# Patient Record
Sex: MALE | Race: WHITE | NOT HISPANIC OR LATINO | Employment: OTHER | URBAN - METROPOLITAN AREA
[De-identification: names, ages, dates, MRNs, and addresses within clinical notes are randomized per-mention and may not be internally consistent; named-entity substitution may affect disease eponyms.]

---

## 2017-07-11 NOTE — PROGRESS NOTES
SUBJECTIVE:                                                    Alex Lynch is a 47 year old male who presents to clinic today for the following health issues:      Back Pain       Duration: 4 days ago         Specific cause: lifting, work-related    Description:   Location of pain: low back bilateral  Character of pain: sharp and constant  Pain radiation:radiates into the left leg  New numbness or weakness in legs, not attributed to pain:  no    Patient was picking up a dock plate and immediately felt a a pain in his back and was intense, he reports pain is somewhat better today , about an 8/10 now , reports was about 10/10 when it happened    Intensity: Currently 8/10    History:   Pain interferes with job: YES  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  Any previous MRI or X-rays: None  Sees a specialist for back pain:  No  Therapies tried without relief: sitting and standing    Alleviating factors:   Improved by: none      Precipitating factors:  Worsened by: Lifting, Bending, Standing and Sitting    Functional and Psychosocial Screen (Katy STarT Back):      Not performed today    Patient went to the urgent care in [place] , got some pain medicine , he reports he was asked to follow up, He is still not feeling better                     Accompanying Signs & Symptoms:  Risk of Fracture:  None  Risk of Cauda Equina:  None  Risk of Infection:  None  Risk of Cancer:  None  Risk of Ankylosing Spondylitis:  Onset at age <35, male, AND morning back stiffness. no           Problem list and histories reviewed & adjusted, as indicated.  Additional history: as documented    Patient Active Problem List   Diagnosis     Tobacco use disorder     Past Surgical History:   Procedure Laterality Date     CARDIAC SURGERY       ENT SURGERY       HERNIA REPAIR       ORTHOPEDIC SURGERY         Social History   Substance Use Topics     Smoking status: Current Every Day Smoker     Packs/day:  "1.00     Years: 30.00     Smokeless tobacco: Never Used     Alcohol use Yes      Comment: rarely     History reviewed. No pertinent family history.      No current outpatient prescriptions on file.     No Known Allergies  BP Readings from Last 3 Encounters:   07/12/17 132/74   12/14/16 (!) 141/96   12/05/16 136/84    Wt Readings from Last 3 Encounters:   07/12/17 217 lb (98.4 kg)   12/14/16 219 lb (99.3 kg)   12/05/16 225 lb (102.1 kg)                  Labs reviewed in EPIC    Reviewed and updated as needed this visit by clinical staff       Reviewed and updated as needed this visit by Provider         ROS:  C: NEGATIVE for fever, chills, change in weight  E/M: NEGATIVE for ear, mouth and throat problems  R: NEGATIVE for significant cough or SOB  CV: NEGATIVE for chest pain, palpitations or peripheral edema  MUSCULOSKELETAL: POSITIVE  for back pain     OBJECTIVE:                                                    /74  Pulse 70  Temp 98.4  F (36.9  C) (Temporal)  Resp 16  Ht 5' 9\" (1.753 m)  Wt 217 lb (98.4 kg)  BMI 32.05 kg/m2  Body mass index is 32.05 kg/(m^2).   GENERAL:  alert,  well hydrated, no distress  RESP: lungs clear to auscultation - no rales, no rhonchi, no wheezes  CV: regular rates and rhythm, normal S1 S2, no S3 or S4 and no murmur, no click or rub -  ABDOMEN: soft, no tenderness, no  hepatosplenomegaly, no masses, normal bowel sounds  Comprehensive back pain exam:  Tenderness of  bilateral midline low back and tightness over the paralumbar muscles, Pain limits the following motions: standing , sitting , bending from side to side , Lower extremity strength functional and equal on both sides, Lower extremity reflexes within normal limits bilaterally, Lower extremity sensation normal and equal on both sides and Straight leg raise negative bilaterally    Diagnostic test results:  Diagnostic Test Results:  Xr Lumbar : Initially reviewed by me , no obvious abnormality noted   Has screws and " plate in the hip      ASSESSMENT/PLAN:                                                    (M54.5) Acute bilateral low back pain without sciatica  (primary encounter diagnosis)  Comment:   Plan: XR Lumbar Spine 2/3 Views, cyclobenzaprine         (FLEXERIL) 10 MG tablet,         HYDROcodone-acetaminophen (NORCO) 5-325 MG per         tablet        Workability form completed , off work for one week , stretching exercises given , will reevalute in 1 week if not better , will consider referral to PT     (W95.983) Back muscle spasm  Comment:   Plan: cyclobenzaprine (FLEXERIL) 10 MG tablet            Follow up with Provider - in 1 weeks for reevaluation      Nidhi Guzman MD, MD  [facility]

## 2017-07-12 ENCOUNTER — OFFICE VISIT (OUTPATIENT)
Dept: FAMILY MEDICINE | Facility: OTHER | Age: 47
End: 2017-07-12
Payer: OTHER MISCELLANEOUS

## 2017-07-12 ENCOUNTER — RADIANT APPOINTMENT (OUTPATIENT)
Dept: GENERAL RADIOLOGY | Facility: OTHER | Age: 47
End: 2017-07-12
Attending: FAMILY MEDICINE
Payer: OTHER MISCELLANEOUS

## 2017-07-12 VITALS
BODY MASS INDEX: 32.14 KG/M2 | HEART RATE: 70 BPM | TEMPERATURE: 98.4 F | RESPIRATION RATE: 16 BRPM | WEIGHT: 217 LBS | SYSTOLIC BLOOD PRESSURE: 132 MMHG | DIASTOLIC BLOOD PRESSURE: 74 MMHG | HEIGHT: 69 IN

## 2017-07-12 DIAGNOSIS — M54.50 ACUTE BILATERAL LOW BACK PAIN WITHOUT SCIATICA: Primary | ICD-10-CM

## 2017-07-12 DIAGNOSIS — M54.50 ACUTE BILATERAL LOW BACK PAIN WITHOUT SCIATICA: ICD-10-CM

## 2017-07-12 DIAGNOSIS — M62.830 BACK MUSCLE SPASM: ICD-10-CM

## 2017-07-12 PROCEDURE — 72100 X-RAY EXAM L-S SPINE 2/3 VWS: CPT

## 2017-07-12 PROCEDURE — 99214 OFFICE O/P EST MOD 30 MIN: CPT | Performed by: FAMILY MEDICINE

## 2017-07-12 RX ORDER — CYCLOBENZAPRINE HCL 10 MG
10 TABLET ORAL 3 TIMES DAILY PRN
Qty: 90 TABLET | Refills: 1 | Status: SHIPPED | OUTPATIENT
Start: 2017-07-12 | End: 2018-01-05

## 2017-07-12 RX ORDER — HYDROCODONE BITARTRATE AND ACETAMINOPHEN 5; 325 MG/1; MG/1
1-2 TABLET ORAL EVERY 8 HOURS PRN
Qty: 10 TABLET | Refills: 0 | Status: SHIPPED | OUTPATIENT
Start: 2017-07-12 | End: 2017-07-28

## 2017-07-12 ASSESSMENT — PAIN SCALES - GENERAL: PAINLEVEL: EXTREME PAIN (8)

## 2017-07-12 NOTE — MR AVS SNAPSHOT
"              After Visit Summary   2017    Alex Lynch    MRN: [id number]           Patient Information     Date Of Birth          1970        Visit Information        Provider Department      2017 8:40 AM Nidhi Guzman MD [facility]        Today's Diagnoses     Acute bilateral low back pain without sciatica    -  1    Back muscle spasm           Follow-ups after your visit        Who to contact     If you have questions or need follow up information about today's clinic visit or your schedule please contact [facility] directly at [phone].  Normal or non-critical lab and imaging results will be communicated to you by [employer], letter or phone within 4 business days after the clinic has received the results. If you do not hear from us within 7 days, please contact the clinic through [employer] or phone. If you have a critical or abnormal lab result, we will notify you by phone as soon as possible.  Submit refill requests through [employer] or call your pharmacy and they will forward the refill request to us. Please allow 3 business days for your refill to be completed.          Additional Information About Your Visit        MyChart Information     [employer] lets you send messages to your doctor, view your test results, renew your prescriptions, schedule appointments and more. To sign up, go to [email or web address] . Click on \"Log in\" on the left side of the screen, which will take you to the Welcome page. Then click on \"Sign up Now\" on the right side of the page.     You will be asked to enter the access code listed below, as well as some personal information. Please follow the directions to create your username and password.     Your access code is: [email or web address]  Expires: 10/10/2017  9:33 AM     Your access code will  in 90 days. If you need help or a new code, please call your [facility] or [phone].        Care EveryWhere ID     This " "is your Care EveryWhere ID. This could be used by other organizations to access your [place] medical records  [id number]        Your Vitals Were     Pulse Temperature Respirations Height BMI (Body Mass Index)       70 98.4  F (36.9  C) (Temporal) 16 5' 9\" (1.753 m) 32.05 kg/m2        Blood Pressure from Last 3 Encounters:   07/12/17 132/74   12/14/16 (!) 141/96   12/05/16 136/84    Weight from Last 3 Encounters:   07/12/17 217 lb (98.4 kg)   12/14/16 219 lb (99.3 kg)   12/05/16 225 lb (102.1 kg)                 Today's Medication Changes          These changes are accurate as of: 7/12/17  9:33 AM.  If you have any questions, ask your nurse or doctor.               Start taking these medicines.        Dose/Directions    cyclobenzaprine 10 MG tablet   Commonly known as:  FLEXERIL   Used for:  Back muscle spasm, Acute bilateral low back pain without sciatica   Started by:  Nidhi Guzman MD        Dose:  10 mg   Take 1 tablet (10 mg) by mouth 3 times daily as needed for muscle spasms   Quantity:  90 tablet   Refills:  1       HYDROcodone-acetaminophen 5-325 MG per tablet   Commonly known as:  NORCO   Used for:  Acute bilateral low back pain without sciatica   Started by:  Nidhi Guzman MD        Dose:  1-2 tablet   Take 1-2 tablets by mouth every 8 hours as needed for moderate to severe pain maximum 3tablet(s) per day   Quantity:  10 tablet   Refills:  0            Where to get your medicines      These medications were sent to [facility] - [address]  [address] [address]     Phone:  [phone]     cyclobenzaprine 10 MG tablet         Some of these will need a paper prescription and others can be bought over the counter.  Ask your nurse if you have questions.     Bring a paper prescription for each of these medications     HYDROcodone-acetaminophen 5-325 MG per tablet                Primary Care Provider Office Phone #    Octavio " [facility] [phone]       No address on file        Equal Access to Services     SELINA MARIBEL : Hadii aad ku hadmaribelalice Soalen, waverónicada luqadaha, qajaironta kaaugustda rebecajoycelynrachel, aydee snyder inezshaji mendesmaiteboni hatch. So [facility] [phone].    ATENCIÓN: Si habla español, tiene a bruce disposición servicios gratuitos de asistencia lingüística. Llame al [phone].    We comply with applicable federal civil rights laws and [place] laws. We do not discriminate on the basis of race, color, national origin, age, disability sex, sexual orientation or gender identity.            Thank you!     Thank you for choosing [facility]  for your care. Our goal is always to provide you with excellent care. Hearing back from our patients is one way we can continue to improve our services. Please take a few minutes to complete the written survey that you may receive in the mail after your visit with us. Thank you!             Your Updated Medication List - Protect others around you: Learn how to safely use, store and throw away your medicines at www.disposemymeds.org.          This list is accurate as of: 7/12/17  9:33 AM.  Always use your most recent med list.                   Brand Name Dispense Instructions for use Diagnosis    cyclobenzaprine 10 MG tablet    FLEXERIL    90 tablet    Take 1 tablet (10 mg) by mouth 3 times daily as needed for muscle spasms    Back muscle spasm, Acute bilateral low back pain without sciatica       HYDROcodone-acetaminophen 5-325 MG per tablet    NORCO    10 tablet    Take 1-2 tablets by mouth every 8 hours as needed for moderate to severe pain maximum 3tablet(s) per day    Acute bilateral low back pain without sciatica

## 2017-07-12 NOTE — NURSING NOTE
"Chief Complaint   Patient presents with     Back Pain     Panel Management     mychart, tdap, ldl, carlin        Initial /74  Pulse 70  Temp 98.4  F (36.9  C) (Temporal)  Resp 16  Ht 5' 9\" (1.753 m)  Wt 217 lb (98.4 kg)  BMI 32.05 kg/m2 Estimated body mass index is 32.05 kg/(m^2) as calculated from the following:    Height as of this encounter: 5' 9\" (1.753 m).    Weight as of this encounter: 217 lb (98.4 kg).  Medication Reconciliation: complete     Nayely Yousif MA    "

## 2017-07-12 NOTE — LETTER
[facility]  [address] [place] [id number] [phone]    REPORT OF WORK ABILITY    NOTE TO EMPLOYEE: You must promptly provide a copy of this report to your  employer or worker's compensation insurer, and Qualified Rehabilitation Consultant.    Date: 7/12/2017                     Employee Name: Alex Lynch         YOB: 1970  Medical Record Number: [id number]   Soc.Sec.No: [id number]  Employer: [employer]                Date of Injury: 07/07/17  Managed Care Organization / Insurance Company Name: UNKNOWN    Diagnosis:  Low back pain without sciatica due to injury  Low back strain     Work Related: yes    Permanent Partial Disability(PPD) likely: UNKNOWN    EMPLOYEE IS UNABLE TO WORK: 07/12/17- 07/19/17         TREATMENT PLAN/NOTES: Stretches and continue medications as discussed.      Nidhi Guzman MD

## 2017-07-17 NOTE — PROGRESS NOTES
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  Date 07/12/2017  Sees a specialist for back pain:  No  Therapies tried without relief: none    Alleviating factors:   Improved by: muscle relaxants and opioids      Precipitating factors:  Worsened by: Standing on both feet, patient states he has to apply most of his weight onto his right side to alleviate some of the pressure on his left. He states he is feeling pain in his abdomen when standing on both feet.     Functional and Psychosocial Screen (Katy STarT Back):      Not performed today              Problem list and histories reviewed & adjusted, as indicated.  Additional history: as documented    Patient Active Problem List   Diagnosis     Tobacco use disorder     Past Surgical History:   Procedure Laterality Date     CARDIAC SURGERY       ENT SURGERY       HERNIA REPAIR       ORTHOPEDIC SURGERY         Social History   Substance Use Topics     Smoking status: Current Every Day Smoker     Packs/day: 1.00     Years: 30.00     Smokeless tobacco: Never Used     Alcohol use Yes      Comment: rarely     History reviewed. No pertinent family history.      Current Outpatient Prescriptions   Medication Sig Dispense Refill     cyclobenzaprine (FLEXERIL) 10 MG tablet Take 1 tablet (10 mg) by mouth 3 times daily as needed for muscle spasms 90 tablet 1     HYDROcodone-acetaminophen  "(NORCO) 5-325 MG per tablet Take 1-2 tablets by mouth every 8 hours as needed for moderate to severe pain maximum 3tablet(s) per day 10 tablet 0     No Known Allergies  BP Readings from Last 3 Encounters:   07/18/17 130/68   07/12/17 132/74   12/14/16 (!) 141/96    Wt Readings from Last 3 Encounters:   07/18/17 217 lb (98.4 kg)   07/12/17 217 lb (98.4 kg)   12/14/16 219 lb (99.3 kg)                  Labs reviewed in EPIC    Reviewed and updated as needed this visit by clinical staff  Tobacco  Allergies  Meds  Med Hx  Surg Hx  Fam Hx  Soc Hx      Reviewed and updated as needed this visit by Provider         ROS:  C: NEGATIVE for fever, chills, change in weight  E/M: NEGATIVE for ear, mouth and throat problems  R: NEGATIVE for significant cough or SOB  CV: NEGATIVE for chest pain, palpitations or peripheral edema  MUSCULOSKELETAL: POSITIVE  for back pain     OBJECTIVE:                                                    /68  Pulse 72  Temp 98  F (36.7  C) (Temporal)  Resp 16  Ht 5' 9\" (1.753 m)  Wt 217 lb (98.4 kg)  BMI 32.05 kg/m2  Body mass index is 32.05 kg/(m^2).   GENERAL:  alert,  well hydrated, no distress  RESP: lungs clear to auscultation - no rales, no rhonchi, no wheezes  CV: regular rates and rhythm, normal S1 S2, no S3 or S4 and no murmur, no click or rub -  ABDOMEN: soft, no tenderness, no  hepatosplenomegaly, no masses, normal bowel sounds  Comprehensive back pain exam:  Tenderness of  bilateral midline low back and tightness over the paralumbar muscles, Able to walk now without significant pain, Lower extremity strength functional and equal on both sides, Lower extremity reflexes within normal limits bilaterally, Lower extremity sensation normal and equal on both sides and Straight leg raise negative bilaterally    Diagnostic test results:  Diagnostic Test Results:  Xr Lumbar : Initially reviewed by me , no obvious abnormality noted   Has screws and plate in the hip  "     ASSESSMENT/PLAN:                                                    ((M54.5) Bilateral low back pain without sciatica, unspecified chronicity  (primary encounter diagnosis)  Comment: Continue with medication , will start PT   Plan: PHYSICAL THERAPY REFERRAL        Work excuse for another week given             Follow up with Provider - in 1 weeks for reevaluation      Nidhi Guzman MD, MD  [facility]

## 2017-07-18 ENCOUNTER — OFFICE VISIT (OUTPATIENT)
Dept: FAMILY MEDICINE | Facility: OTHER | Age: 47
End: 2017-07-18
Payer: OTHER MISCELLANEOUS

## 2017-07-18 VITALS
WEIGHT: 217 LBS | TEMPERATURE: 98 F | SYSTOLIC BLOOD PRESSURE: 130 MMHG | HEART RATE: 72 BPM | BODY MASS INDEX: 32.14 KG/M2 | HEIGHT: 69 IN | RESPIRATION RATE: 16 BRPM | DIASTOLIC BLOOD PRESSURE: 68 MMHG

## 2017-07-18 DIAGNOSIS — M54.50 BILATERAL LOW BACK PAIN WITHOUT SCIATICA, UNSPECIFIED CHRONICITY: Primary | ICD-10-CM

## 2017-07-18 PROCEDURE — 99213 OFFICE O/P EST LOW 20 MIN: CPT | Performed by: FAMILY MEDICINE

## 2017-07-18 ASSESSMENT — PAIN SCALES - GENERAL: PAINLEVEL: MODERATE PAIN (4)

## 2017-07-18 NOTE — LETTER
[facility]  [address] [place] [id number] [phone]    REPORT OF WORK ABILITY    NOTE TO EMPLOYEE: You must promptly provide a copy of this report to your  employer or worker's compensation insurer, and Qualified Rehabilitation Consultant.    Date: 7/18/2017                     Employee Name: Alex Lynch         YOB: 1970  Medical Record Number: [id number]   Soc.Sec.No: [id number]  Employer: [employer]                Date of Injury: 07/07/17  Managed Care Organization / Insurance Company Name: UNKNOWN    Diagnosis:  Low back pain without sciatica due to injury  Low back strain     Work Related: yes    Permanent Partial Disability(PPD) likely: UNKNOWN    EMPLOYEE IS UNABLE TO WORK: 07/19/17- 07/28/17         TREATMENT PLAN/NOTES: Stretches and continue medications, referral placed to PT       Nidhi Guzman MD

## 2017-07-18 NOTE — NURSING NOTE
"Chief Complaint   Patient presents with     Back Pain     Panel Management     tdap, ldl, mychart        Initial /68  Pulse 72  Temp 98  F (36.7  C) (Temporal)  Resp 16  Ht 5' 9\" (1.753 m)  Wt 217 lb (98.4 kg)  BMI 32.05 kg/m2 Estimated body mass index is 32.05 kg/(m^2) as calculated from the following:    Height as of this encounter: 5' 9\" (1.753 m).    Weight as of this encounter: 217 lb (98.4 kg).  Medication Reconciliation: complete     Nayely Yousif MA    "

## 2017-07-18 NOTE — MR AVS SNAPSHOT
"              After Visit Summary   7/18/2017    Alex Lynch    MRN: [id number]           Patient Information     Date Of Birth          1970        Visit Information        Provider Department      7/18/2017 2:00 PM Nidhi Guzman MD [facility] [place]        Today's Diagnoses     Bilateral low back pain without sciatica, unspecified chronicity    -  1       Follow-ups after your visit        Additional Services     PHYSICAL THERAPY REFERRAL       *This therapy referral will be filtered to a centralized scheduling office at [facility] and the patient will receive a call to schedule an appointment at a [place] location most convenient for them. *     [facility] provides Physical Therapy evaluation and treatment and many specialty services across the [place] system.  If requesting a specialty program, please choose from the list below.    If you have not heard from the scheduling office within 2 business days, please call [phone] for all locations, with the exception of [place], please call [phone].  Treatment: Evaluation & Treatment  Special Instructions/Modalities: None  Special Programs: None    Please be aware that coverage of these services is subject to the terms and limitations of your health insurance plan.  Call member services at your health plan with any benefit or coverage questions.      **Note to Provider:  If you are referring outside of [place] for the therapy appointment, please list the name of the location in the \"special instructions\" above, print the referral and give to the patient to schedule the appointment.                  Who to contact     If you have questions or need follow up information about today's clinic visit or your schedule please contact [facility] directly at [phone].  Normal or non-critical lab and imaging results will be communicated to you by MyChart, letter or phone " "within 4 business days after the clinic has received the results. If you do not hear from us within 7 days, please contact the clinic through [employer] or phone. If you have a critical or abnormal lab result, we will notify you by phone as soon as possible.  Submit refill requests through [employer] or call your pharmacy and they will forward the refill request to us. Please allow 3 business days for your refill to be completed.          Additional Information About Your Visit        [employer] Information     [employer] lets you send messages to your doctor, view your test results, renew your prescriptions, schedule appointments and more. To sign up, go to [email or web address] . Click on \"Log in\" on the left side of the screen, which will take you to the Welcome page. Then click on \"Sign up Now\" on the right side of the page.     You will be asked to enter the access code listed below, as well as some personal information. Please follow the directions to create your username and password.     Your access code is: [email or web address]  Expires: 10/10/2017  9:33 AM     Your access code will  in 90 days. If you need help or a new code, please call your [place] clinic or [phone].        Care EveryWhere ID     This is your Care EveryWhere ID. This could be used by other organizations to access your [place] medical records  [id number]        Your Vitals Were     Pulse Temperature Respirations Height BMI (Body Mass Index)       72 98  F (36.7  C) (Temporal) 16 5' 9\" (1.753 m) 32.05 kg/m2        Blood Pressure from Last 3 Encounters:   17 130/68   17 132/74   16 (!) 141/96    Weight from Last 3 Encounters:   17 217 lb (98.4 kg)   17 217 lb (98.4 kg)   16 219 lb (99.3 kg)              We Performed the Following     PHYSICAL THERAPY REFERRAL        Primary Care Provider Office Phone #    [facility] [phone]       No address on file        Equal Access to " Services     [facility]: Hadii aad ku hadmaribelalice Mirianalen, waaxda luqadaha, qaybta kaalmarachel garcia, aydee werner . So [facility] [phone].    ATENCIÓN: Si habla español, tiene a bruce disposición servicios gratuitos de asistencia lingüística. Llame al [phone].    We comply with applicable federal civil rights laws and [place] laws. We do not discriminate on the basis of race, color, national origin, age, disability sex, sexual orientation or gender identity.            Thank you!     Thank you for choosing [facility]  for your care. Our goal is always to provide you with excellent care. Hearing back from our patients is one way we can continue to improve our services. Please take a few minutes to complete the written survey that you may receive in the mail after your visit with us. Thank you!             Your Updated Medication List - Protect others around you: Learn how to safely use, store and throw away your medicines at www.disposemymeds.org.          This list is accurate as of: 7/18/17  2:16 PM.  Always use your most recent med list.                   Brand Name Dispense Instructions for use Diagnosis    cyclobenzaprine 10 MG tablet    FLEXERIL    90 tablet    Take 1 tablet (10 mg) by mouth 3 times daily as needed for muscle spasms    Back muscle spasm, Acute bilateral low back pain without sciatica       HYDROcodone-acetaminophen 5-325 MG per tablet    NORCO    10 tablet    Take 1-2 tablets by mouth every 8 hours as needed for moderate to severe pain maximum 3tablet(s) per day    Acute bilateral low back pain without sciatica

## 2017-07-24 ENCOUNTER — HOSPITAL ENCOUNTER (OUTPATIENT)
Dept: PHYSICAL THERAPY | Facility: OTHER | Age: 47
Setting detail: THERAPIES SERIES
End: 2017-07-24
Attending: FAMILY MEDICINE
Payer: OTHER MISCELLANEOUS

## 2017-07-24 PROCEDURE — 97162 PT EVAL MOD COMPLEX 30 MIN: CPT | Mod: GP | Performed by: PHYSICAL THERAPIST

## 2017-07-24 PROCEDURE — 97112 NEUROMUSCULAR REEDUCATION: CPT | Mod: GP | Performed by: PHYSICAL THERAPIST

## 2017-07-24 PROCEDURE — 40000718 ZZHC STATISTIC PT DEPARTMENT ORTHO VISIT: Performed by: PHYSICAL THERAPIST

## 2017-07-24 PROCEDURE — 97110 THERAPEUTIC EXERCISES: CPT | Mod: GP | Performed by: PHYSICAL THERAPIST
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
## 2017-07-25 ENCOUNTER — TELEPHONE (OUTPATIENT)
Dept: FAMILY MEDICINE | Facility: OTHER | Age: 47
End: 2017-07-25

## 2017-07-25 NOTE — TELEPHONE ENCOUNTER
LM for patient to return call to clinic. When call is returned please ask patient to schedule a follow up- we received a letter from his employer's insurance stating they are anticipating him to return to work with light duty restrictions. Please have him follow up and reevaluated and can write workability note at [facility] depending on how he is doing.    Nayely Yousif MA

## 2017-07-26 NOTE — PROGRESS NOTES
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  Date 07/12/2017  Sees a specialist for back pain:  No  Therapies tried without relief: none    Alleviating factors:   Improved by: muscle relaxants and opioids      Precipitating factors:  Worsened by: Standing on both feet, patient states he has to apply most of his weight onto his right side to alleviate some of the pressure on his left. He states he is feeling pain in his abdomen when standing on both feet. Now having difficult standing up straight     Functional and Psychosocial Screen (Katy STarT Back):      Not performed today    He reports he started PT yesterday , felt better after PT yesterday , but went walking his dog yesterday and had to bend down to help clean his poop up and re injured his back , since last night , pain has been worse since then , back to where he started from             Problem list and histories reviewed & adjusted, as indicated.  Additional history: as documented    Patient Active Problem List   Diagnosis     Tobacco use disorder     Past Surgical History:   Procedure Laterality Date     CARDIAC SURGERY       ENT SURGERY       HERNIA REPAIR       ORTHOPEDIC SURGERY         Social History   Substance Use Topics     Smoking status: Current Every Day Smoker     Packs/day: 1.00     Years: 30.00     Types: Cigarettes     Smokeless tobacco:  "Never Used     Alcohol use Yes      Comment: rarely     History reviewed. No pertinent family history.      Current Outpatient Prescriptions   Medication Sig Dispense Refill     cyclobenzaprine (FLEXERIL) 10 MG tablet Take 1 tablet (10 mg) by mouth 3 times daily as needed for muscle spasms 90 tablet 1     HYDROcodone-acetaminophen (NORCO) 5-325 MG per tablet Take 1-2 tablets by mouth every 8 hours as needed for moderate to severe pain maximum 3tablet(s) per day 10 tablet 0     No Known Allergies  BP Readings from Last 3 Encounters:   07/28/17 128/70   07/18/17 130/68   07/12/17 132/74    Wt Readings from Last 3 Encounters:   07/28/17 218 lb (98.9 kg)   07/18/17 217 lb (98.4 kg)   07/12/17 217 lb (98.4 kg)                  Labs reviewed in EPIC    Reviewed and updated as needed this visit by clinical staff  Tobacco  Allergies  Med Hx  Surg Hx  Fam Hx  Soc Hx      Reviewed and updated as needed this visit by Provider         ROS:  C: NEGATIVE for fever, chills, change in weight  E/M: NEGATIVE for ear, mouth and throat problems  R: NEGATIVE for significant cough or SOB  CV: NEGATIVE for chest pain, palpitations or peripheral edema  MUSCULOSKELETAL: POSITIVE  for back pain     OBJECTIVE:                                                    /70  Pulse 78  Temp 98.4  F (36.9  C) (Temporal)  Resp 16  Ht 5' 9\" (1.753 m)  Wt 218 lb (98.9 kg)  BMI 32.19 kg/m2  Body mass index is 32.19 kg/(m^2).   GENERAL:  alert,  well hydrated, no distress  RESP: lungs clear to auscultation - no rales, no rhonchi, no wheezes  CV: regular rates and rhythm, normal S1 S2, no S3 or S4 and no murmur, no click or rub -  ABDOMEN: soft, no tenderness, no  hepatosplenomegaly, no masses, normal bowel sounds  Comprehensive back pain exam: Tenderness of  bilateral midline low back and tightness over the paralumbar muscles, Pain limits the following motions: standing , sitting , bending from side to side , Lower extremity strength " functional and equal on both sides, Lower extremity reflexes within normal limits bilaterally, Lower extremity sensation normal and equal on both sides and Straight leg raise negative bilaterally    Diagnostic test results:  Diagnostic Test Results:  Xr Lumbar : Initially reviewed by me , no obvious abnormality noted   Has screws and plate in the hip      ASSESSMENT/PLAN:                                                    (M54.5) Bilateral low back pain without sciatica, unspecified chronicity  (primary encounter diagnosis)  Comment: worsening , will refer Ortho for further evaluation and management , continue with PT   Plan: ORTHO  REFERRAL            (M62.830) Back muscle spasm  Comment:   Plan: Continue with Flexeril to help relax muscles    (M54.5) Acute bilateral low back pain without sciatica  Plan: HYDROcodone-acetaminophen (NORCO) 5-325 MG per         tablet          Work excuse given , FMLA papers completed today     Follow up with Provider - referral placed to Ortho     Nidhi Guzman MD, MD  [facility]

## 2017-07-27 ENCOUNTER — HOSPITAL ENCOUNTER (OUTPATIENT)
Dept: PHYSICAL THERAPY | Facility: OTHER | Age: 47
Setting detail: THERAPIES SERIES
End: 2017-07-27
Attending: FAMILY MEDICINE
Payer: OTHER MISCELLANEOUS

## 2017-07-27 PROCEDURE — 97140 MANUAL THERAPY 1/> REGIONS: CPT | Mod: GP | Performed by: PHYSICAL THERAPIST

## 2017-07-27 PROCEDURE — 97530 THERAPEUTIC ACTIVITIES: CPT | Mod: GP | Performed by: PHYSICAL THERAPIST

## 2017-07-27 PROCEDURE — 40000718 ZZHC STATISTIC PT DEPARTMENT ORTHO VISIT: Performed by: PHYSICAL THERAPIST

## 2017-07-28 ENCOUNTER — OFFICE VISIT (OUTPATIENT)
Dept: FAMILY MEDICINE | Facility: OTHER | Age: 47
End: 2017-07-28
Payer: OTHER MISCELLANEOUS

## 2017-07-28 VITALS
SYSTOLIC BLOOD PRESSURE: 128 MMHG | TEMPERATURE: 98.4 F | HEART RATE: 78 BPM | DIASTOLIC BLOOD PRESSURE: 70 MMHG | HEIGHT: 69 IN | BODY MASS INDEX: 32.29 KG/M2 | RESPIRATION RATE: 16 BRPM | WEIGHT: 218 LBS

## 2017-07-28 DIAGNOSIS — M62.830 BACK MUSCLE SPASM: ICD-10-CM

## 2017-07-28 DIAGNOSIS — M54.50 BILATERAL LOW BACK PAIN WITHOUT SCIATICA, UNSPECIFIED CHRONICITY: Primary | ICD-10-CM

## 2017-07-28 DIAGNOSIS — M54.50 ACUTE BILATERAL LOW BACK PAIN WITHOUT SCIATICA: ICD-10-CM

## 2017-07-28 PROCEDURE — 99213 OFFICE O/P EST LOW 20 MIN: CPT | Performed by: FAMILY MEDICINE

## 2017-07-28 RX ORDER — HYDROCODONE BITARTRATE AND ACETAMINOPHEN 5; 325 MG/1; MG/1
1-2 TABLET ORAL EVERY 8 HOURS PRN
Qty: 10 TABLET | Refills: 0 | Status: SHIPPED | OUTPATIENT
Start: 2017-07-28 | End: 2017-08-02

## 2017-07-28 ASSESSMENT — PAIN SCALES - GENERAL: PAINLEVEL: SEVERE PAIN (6)
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
## 2017-07-28 NOTE — MR AVS SNAPSHOT
After Visit Summary   7/28/2017    Alex Lynch    MRN: [id number]           Patient Information     Date Of Birth          1970        Visit Information        Provider Department      7/28/2017 1:00 PM Nidhi Guzman MD [facility]        Today's Diagnoses     Bilateral low back pain without sciatica, unspecified chronicity    -  1    Back muscle spasm        Acute bilateral low back pain without sciatica           Follow-ups after your visit        Additional Services     ORTHO  REFERRAL       [facility] is referring you to the Orthopedic  Services at [facility].       The  Representative will assist you in the coordination of your Orthopedic and Musculoskeletal Care as prescribed by your physician.    The  Representative will call you within 1 business day to help schedule your appointment, or you may contact the  Representative at:    All areas ~ [phone]     Type of Referral : Spine: Lumbar  **Choose Medical Spine Specialist (unless patient was seen by a Medical Spine Specialist within the past 6 months).**  Surgical Evaluation is advised if the patient presents with one or more of the following red flags: Evidence of Spinal Tumor, Infection or Fracture, Cauda Equina Syndrome, Sudden or Progressive Weakness, Loss of Bowel or Bladder Control, or any other documented emergent neurological condition resulting from a Lumbar Spinal Condition. Spine Surgeon        Timeframe requested: 3 - 5 days    Coverage of these services is subject to the terms and limitations of your health insurance plan.  Please call member services at your health plan with any benefit or coverage questions.      If X-rays, CT or MRI's have been performed, please contact the facility where they were done to arrange for , prior to your scheduled appointment.  Please bring this referral request to  "your appointment and present it to your specialist.                  Your next 10 appointments already scheduled     Aug 02, 2017  1:45 PM CDT   Ortho Treatment with Telly Moore, PT   [facility]    [address]  [place] [address] [phone]            Aug 03, 2017 11:15 AM CDT   Ortho Treatment with Telly Moore, PT   [facility]    [address]  [place] [address] [phone]              Who to contact     If you have questions or need follow up information about today's clinic visit or your schedule please contact [facility] directly at [phone].  Normal or non-critical lab and imaging results will be communicated to you by MyChart, letter or phone within 4 business days after the clinic has received the results. If you do not hear from us within 7 days, please contact the clinic through MyChart or phone. If you have a critical or abnormal lab result, we will notify you by phone as soon as possible.  Submit refill requests through Smartsheet or call your pharmacy and they will forward the refill request to us. Please allow 3 business days for your refill to be completed.          Additional Information About Your Visit        Smartsheet Information     Smartsheet lets you send messages to your doctor, view your test results, renew your prescriptions, schedule appointments and more. To sign up, go to [email or web address] . Click on \"Log in\" on the left side of the screen, which will take you to the Welcome page. Then click on \"Sign up Now\" on the right side of the page.     You will be asked to enter the access code listed below, as well as some personal information. Please follow the directions to create your username and password.     Your access code is: [email or web address]  Expires: 10/10/2017  9:33 AM     Your access code will  in 90 days. If you need help or a new code, " "please call your [facility] clinic or [phone].        Care EveryWhere ID     This is your Care EveryWhere ID. This could be used by other organizations to access your [facility] medical records  [id number]        Your Vitals Were     Pulse Temperature Respirations Height BMI (Body Mass Index)       78 98.4  F (36.9  C) (Temporal) 16 5' 9\" (1.753 m) 32.19 kg/m2        Blood Pressure from Last 3 Encounters:   07/28/17 128/70   07/18/17 130/68   07/12/17 132/74    Weight from Last 3 Encounters:   07/28/17 218 lb (98.9 kg)   07/18/17 217 lb (98.4 kg)   07/12/17 217 lb (98.4 kg)              We Performed the Following     ORTHO  REFERRAL          Where to get your medicines      Some of these will need a paper prescription and others can be bought over the counter.  Ask your nurse if you have questions.     Bring a paper prescription for each of these medications     HYDROcodone-acetaminophen 5-325 MG per tablet          Primary Care Provider Office Phone #    [facility] [place] [phone]       No address on file        Equal Access to Services     SELINA LÓPEZ : Hadii calos lua Soalen, waverónicada luvipul, qaybta kaalmada radha, aydee werner . So [facility] [phone].    ATENCIÓN: Si habla español, tiene a bruce disposición servicios gratuitos de asistencia lingüística. Llame al [phone].    We comply with applicable federal civil rights laws and [place] laws. We do not discriminate on the basis of race, color, national origin, age, disability sex, sexual orientation or gender identity.            Thank you!     Thank you for choosing [facility]  for your care. Our goal is always to provide you with excellent care. Hearing back from our patients is one way we can continue to improve our services. Please take a few minutes to complete the written survey that you may receive in the mail after your visit with us. Thank you!             Your " Updated Medication List - Protect others around you: Learn how to safely use, store and throw away your medicines at www.disposemymeds.org.          This list is accurate as of: 7/28/17  1:34 PM.  Always use your most recent med list.                   Brand Name Dispense Instructions for use Diagnosis    cyclobenzaprine 10 MG tablet    FLEXERIL    90 tablet    Take 1 tablet (10 mg) by mouth 3 times daily as needed for muscle spasms    Back muscle spasm, Acute bilateral low back pain without sciatica       HYDROcodone-acetaminophen 5-325 MG per tablet    NORCO    10 tablet    Take 1-2 tablets by mouth every 8 hours as needed for moderate to severe pain maximum 3tablet(s) per day    Acute bilateral low back pain without sciatica

## 2017-07-28 NOTE — NURSING NOTE
"Chief Complaint   Patient presents with     Work Comp     Back Pain     Panel Management       Initial /70  Pulse 78  Temp 98.4  F (36.9  C) (Temporal)  Resp 16  Ht 5' 9\" (1.753 m)  Wt 218 lb (98.9 kg)  BMI 32.19 kg/m2 Estimated body mass index is 32.19 kg/(m^2) as calculated from the following:    Height as of this encounter: 5' 9\" (1.753 m).    Weight as of this encounter: 218 lb (98.9 kg).  Medication Reconciliation: complete       Mikaela Romero CMA (AAMA)      "

## 2017-08-02 ENCOUNTER — HOSPITAL ENCOUNTER (OUTPATIENT)
Dept: PHYSICAL THERAPY | Facility: OTHER | Age: 47
Setting detail: THERAPIES SERIES
End: 2017-08-02
Attending: FAMILY MEDICINE
Payer: OTHER MISCELLANEOUS

## 2017-08-02 ENCOUNTER — TELEPHONE (OUTPATIENT)
Dept: FAMILY MEDICINE | Facility: OTHER | Age: 47
End: 2017-08-02

## 2017-08-02 DIAGNOSIS — M54.50 ACUTE BILATERAL LOW BACK PAIN WITHOUT SCIATICA: ICD-10-CM

## 2017-08-02 PROCEDURE — 40000718 ZZHC STATISTIC PT DEPARTMENT ORTHO VISIT: Performed by: PHYSICAL THERAPIST

## 2017-08-02 PROCEDURE — 97112 NEUROMUSCULAR REEDUCATION: CPT | Mod: GP | Performed by: PHYSICAL THERAPIST

## 2017-08-02 PROCEDURE — 97035 APP MDLTY 1+ULTRASOUND EA 15: CPT | Mod: GP | Performed by: PHYSICAL THERAPIST

## 2017-08-02 PROCEDURE — 97110 THERAPEUTIC EXERCISES: CPT | Mod: GP | Performed by: PHYSICAL THERAPIST

## 2017-08-02 RX ORDER — HYDROCODONE BITARTRATE AND ACETAMINOPHEN 5; 325 MG/1; MG/1
1-2 TABLET ORAL EVERY 8 HOURS PRN
Qty: 10 TABLET | Refills: 0 | Status: SHIPPED | OUTPATIENT
Start: 2017-08-02 | End: 2018-01-05

## 2017-08-02 NOTE — TELEPHONE ENCOUNTER
Reason for Call:  Medication or medication refill:    Do you use a [facility]?  Name of the pharmacy and phone number for the current request:  [facility] [place] - [phone]    Name of the medication requested: Vicodin     Other request: Pt received 10 but needs more and would like to  at his PT appointment when he is in [place] today     Can we leave a detailed message on this number? YES    Phone number patient can be reached at: Cell number on file:    Telephone Information:   Mobile [phone]       Best Time: ANY    Call taken on 8/2/2017 at 9:23 AM by Rena Woo

## 2017-08-02 NOTE — TELEPHONE ENCOUNTER
norco      Last Written Prescription Date: 7/28/17  Last Fill Quantity: 10,  # refills:  0  Last Office Visit with [facility] or [facility] prescribing provider:      7/28/17    Routing refill request to provider for review/approval because:  Drug not on the [facility] refill protocol

## 2017-08-03 ENCOUNTER — OFFICE VISIT (OUTPATIENT)
Dept: NEUROSURGERY | Facility: OTHER | Age: 47
End: 2017-08-03
Payer: OTHER MISCELLANEOUS

## 2017-08-03 VITALS — TEMPERATURE: 98 F | BODY MASS INDEX: 32.29 KG/M2 | WEIGHT: 218 LBS | HEIGHT: 69 IN

## 2017-08-03 DIAGNOSIS — M54.50 ACUTE BILATERAL LOW BACK PAIN WITHOUT SCIATICA: Primary | ICD-10-CM

## 2017-08-03 PROCEDURE — 99204 OFFICE O/P NEW MOD 45 MIN: CPT | Performed by: NEUROLOGICAL SURGERY

## 2017-08-03 NOTE — NURSING NOTE
"Alex Lynch is a 47 year old male who presents for:  Chief Complaint   Patient presents with     Consult     LBP     Neurologic Problem        Initial Vitals:  Temp 98  F (36.7  C) (Temporal)  Ht 5' 9\" (1.753 m)  Wt 218 lb (98.9 kg)  BMI 32.19 kg/m2 Estimated body mass index is 32.19 kg/(m^2) as calculated from the following:    Height as of this encounter: 5' 9\" (1.753 m).    Weight as of this encounter: 218 lb (98.9 kg).. Body surface area is 2.19 meters squared. BP completed using cuff size: NA (Not Taken)  Data Unavailable    Do you feel safe in your environment?  Yes  Do you need any refills today? No    Nursing Comments:         [facility]    "

## 2017-08-03 NOTE — PROGRESS NOTES
SUBJECTIVE:                                                    Alex Lynch is a 47 year old male who presents to clinic today for the following health issues:    Back Pain       Duration: x 4 weeks, Patient was seeing Dr. Guzman. Patient states that today he mainly needs a work note.        Specific cause: Twisting and bending/ lifting    Description:   Location of pain: low back bilateral and hip bilateral  Character of pain: sharp, dull ache, stabbing and constant  Pain radiation:none  New numbness or weakness in legs, not attributed to pain:  YES- Left foot- toes get hot and tingly    Intensity: Currently 5/10    History:   Pain interferes with job: YES  History of back problems: Isidro's Disease  Any previous MRI or X-rays: None- MRI coming up  Sees a specialist for back pain:  No- yesterday he saw a neurologist.   Therapies tried without relief: Physical Therapy    Alleviating factors:   Improved by: muscle relaxants and opioids      Precipitating factors:  Worsened by: Standing, Sitting and Walking    Functional and Psychosocial Screen (Katy STarT Back):      Last 2 scores:     KATY START BACK TOTAL SCORE 7/24/2017 7/27/2017   Total Score (all 9) 5 3       Accompanying Signs & Symptoms:  Risk of Fracture:  None  Risk of Cauda Equina:  None  Risk of Infection:  None  Risk of Cancer:  None  Risk of Ankylosing Spondylitis:  Onset at age <35, male, AND morning back stiffness. no     PROBLEMS TO ADD ON...    Problem list and histories reviewed & adjusted, as indicated.  Additional history: as documented    Current Outpatient Prescriptions   Medication Sig Dispense Refill     HYDROcodone-acetaminophen (NORCO) 5-325 MG per tablet Take 1-2 tablets by mouth every 8 hours as needed for moderate to severe pain maximum 3tablet(s) per day 10 tablet 0     cyclobenzaprine (FLEXERIL) 10 MG tablet Take 1 tablet (10 mg) by mouth 3 times daily as needed for muscle spasms 90 tablet 1     No Known  "Allergies    Reviewed and updated as needed this visit by clinical staff       Reviewed and updated as needed this visit by Provider         ROS:  Constitutional, HEENT, cardiovascular, pulmonary, gi and gu systems are negative, except as otherwise noted.      OBJECTIVE:   /82  Pulse 74  Temp 98  F (36.7  C) (Temporal)  Resp 16  Ht 5' 9\" (1.753 m)  Wt 217 lb (98.4 kg)  BMI 32.05 kg/m2  Body mass index is 32.05 kg/(m^2).  GENERAL: healthy, alert and no distress  NECK: no adenopathy,  MS: no gross musculoskeletal defects noted, no edema  NEURO: Normal strength and tone, mentation intact and speech normal  Patient appears to be in mild to moderate pain, antalgic gait noted. Lumbosacral spine area reveals no local tenderness or mass.  Painful and reduced LS ROM noted. Straight leg raise is positive, motor strength and sensation normal.    ASSESSMENT/PLAN:       ICD-10-CM    1. Acute bilateral low back pain without sciatica M54.5    2. Tobacco use disorder F17.200 TOBACCO CESSATION - FOR HEALTH MAINTENANCE     Lipid panel reflex to direct LDL     For acute pain, rest, intermittent application of ice or warm packs, analgesics and muscle relaxants are recommended. Discussed longer term treatment plan of prn NSAID's and back care exercise program, continue Physical Therapy. Call or return to clinic prn if these symptoms worsen or fail to improve as anticipated.    Patient Instructions       Back Care Tips    Caring for your back  These are things you can do to prevent a recurrence of acute back pain and to reduce symptoms from chronic back pain:    Maintain a healthy weight. If you are overweight, losing weight will help most types of back pain.    Exercise is an important part of recovery from most types of back pain. The muscles behind and in front of the spine support the back. This means strengthening both the back muscles and the abdominal muscles will provide better support for your spine.     Swimming and " brisk walking are good overall exercises to improve your fitness level.    Practice safe lifting methods (below).    Practice good posture when sitting, standing and walking. Avoid prolonged sitting. This puts more stress on the lower back than standing or walking.    Wear quality shoes with sufficient arch support. Foot and ankle alignment can affect back symptoms. Women should avoid wearing high heels.    Therapeutic massage can help relax the back muscles without stretching them.    During the first 24 to 72 hours after an acute injury or flare-up of chronic back pain, apply an ice pack to the painful area for 20 minutes and then remove it for 20 minutes, over a period of 60 to 90 minutes, or several times a day. As a safety precaution, do not use a heating pad at bedtime. Sleeping on a heating pad can lead to skin burns or tissue damage.    You can alternate ice and heat therapies.  Medications  Talk to your healthcare provider before using medicines, especially if you have other medical problems or are taking other medicines.    You may use acetaminophen or ibuprofen to control pain, unless your healthcare provider prescribed other pain medicine. If you have chronic conditions like diabetes, liver or kidney disease, stomach ulcers, or gastrointestinal bleeding, or are taking blood thinners, talk with your healthcare provider before taking any medicines.    Be careful if you are given prescription pain medicines, narcotics, or medicine for muscle spasm. They can cause drowsiness, affect your coordination, reflexes, and judgment. Do not drive or operate heavy machinery while taking these types of medicines. Take prescription pain medicine only as prescribed by your healthcare provider.  Lumbar stretch  Here is a simple stretching exercise that will help relax muscle spasm and keep your back more limber. If exercise makes your back pain worse, don t do it.    Lie on your back with your knees bent and both feet on  the ground.    Slowly raise your left knee to your chest as you flatten your lower back against the floor. Hold for 5 seconds.    Relax and repeat the exercise with your right knee.    Do 10 of these exercises for each leg.  Safe lifting method    Don t bend over at the waist to lift an object off the floor.  Instead, bend your knees and hips in a squat.     Keep your back and head upright    Hold the object close to your body, directly in front of you.    Straighten your legs to lift the object.     Lower the object to the floor in the reverse fashion.    If you must slide something across the floor, push it.  Posture tips  Sitting  Sit in chairs with straight backs or low-back support. Keep your knees lower than your hips, with your feet flat on the floor.  When driving, sit up straight. Adjust the seat forward so you are not leaning toward the steering wheel.  A small pillow or rolled towel behind your lower back may help if you are driving long distances.   Standing  When standing for long periods, shift most of your weight to one leg at a time. Alternate legs every few minutes.   Sleeping  The best way to sleep is on your side with your knees bent. Put a low pillow under your head to support your neck in a neutral spine position. Avoid thick pillows that bend your neck to one side. Put a pillow between your legs to further relax your lower back. If you sleep on your back, put pillows under your knees to support your legs in a slightly flexed position. Use a firm mattress. If your mattress sags, replace it, or use a 1/2-inch plywood board under the mattress to add support.  Follow-up care  Follow up with your healthcare provider, or as advised.  If X-rays, a CT scan or an MRI scan were taken, they will be reviewed by a radiologist. You will be notified of any new findings that may affect your care.  Call 911  Seek emergency medical care if any of the following occur:    Trouble breathing    Confusion    Very  drowsy    Fainting or loss of consciousness    Rapid or very slow heart rate    Loss of  bowel or bladder control  When to seek medical care  Call your healthcare provider if any of the following occur:    Pain becomes worse or spreads to your arms or legs    Weakness or numbness in one or both arms or legs    Numbness in the groin area  Date Last Reviewed: 6/1/2016 2000-2017 [employer]. [address] [place] [address], [place] [address]. All rights reserved. This information is not intended as a substitute for professional medical care. Always follow your healthcare professional's instructions.        Relieving Back Pain  Back pain is a common problem. You can strain back muscles by lifting too much weight or just by moving the wrong way. Back strain can be uncomfortable, even painful. And it can take weeks or months to improve. To help yourself feel better and prevent future back strains, try these tips.  Important Note: Do not give aspirin to children or teens without first discussing it with your healthcare provider.      ? Ice    Ice reduces muscle pain and swelling. It helps most during the first 24 to 48 hours after an injury.    Wrap an ice pack or a bag of frozen peas in a thin towel. (Never place ice directly on your skin.)    Place the ice where your back hurts the most.    Don t ice for more than 20 minutes at a time.    You can use ice several times a day.  ? Medicines  Over-the-counter pain relievers can include acetaminophen and anti-inflammatory medicines, which includes aspirin or ibuprofen. They can help ease discomfort. Some also reduce swelling.    Tell your healthcare provider about any medicines you are already taking.    Take medicines only as directed.  ? Heat  After the first 48 hours, heat can relax sore muscles and improve blood flow.    Try a warm bath or shower. Or use a heating pad set on low. To prevent a burn, keep a cloth between you and the heating pad.    Don t use a  heating pad for more than 15 minutes at a time. Never sleep on a heating pad.  Date Last Reviewed: 9/1/2015 2000-2017 [employer]. [address] [place] [address], [place] [address]. All rights reserved. This information is not intended as a substitute for professional medical care. Always follow your healthcare professional's instructions.            Urszula Babin MD  [facility]

## 2017-08-03 NOTE — MR AVS SNAPSHOT
"              After Visit Summary   8/3/2017    Alex Lynch    MRN: [id number]           Patient Information     Date Of Birth          1970        Visit Information        Provider Department      8/3/2017 10:00 AM Akbar Brennan MD [facility]        Today's Diagnoses     Bilateral low back pain without sciatica    -  1      Care Instructions    Please obtain lumbar MRI. We will call you with the results.               Follow-ups after your visit        Your next 10 appointments already scheduled     Aug 03, 2017 11:15 AM CDT   Ortho Treatment with Telly Moore PT   [facility]    [id number] [facility] [address] [phone]              Future tests that were ordered for you today     Open Future Orders        Priority Expected Expires Ordered    MR Lumbar Spine w/o Contrast Routine  8/3/2018 8/3/2017            Who to contact     If you have questions or need follow up information about today's clinic visit or your schedule please contact [facility] directly at [phone].  Normal or non-critical lab and imaging results will be communicated to you by MyChart, letter or phone within 4 business days after the clinic has received the results. If you do not hear from us within 7 days, please contact the clinic through [email or web address] or phone. If you have a critical or abnormal lab result, we will notify you by phone as soon as possible.  Submit refill requests through [facility] or call your pharmacy and they will forward the refill request to us. Please allow 3 business days for your refill to be completed.          Additional Information About Your Visit        MyChart Information     [facility] lets you send messages to your doctor, view your test results, renew your prescriptions, schedule appointments and more. To sign up, go to [email or web address] . Click on \"Log in\" on the left side of the screen, which " "will take you to the Welcome page. Then click on \"Sign up Now\" on the right side of the page.     You will be asked to enter the access code listed below, as well as some personal information. Please follow the directions to create your username and password.     Your access code is: [email or web address]  Expires: 10/10/2017  9:33 AM     Your access code will  in 90 days. If you need help or a new code, please call your [facility] clinic or [phone].        Care EveryWhere ID     This is your Care EveryWhere ID. This could be used by other organizations to access your [facility] medical records  [id number]        Your Vitals Were     Temperature Height BMI (Body Mass Index)             98  F (36.7  C) (Temporal) 5' 9\" (1.753 m) 32.19 kg/m2          Blood Pressure from Last 3 Encounters:   17 128/70   17 130/68   17 132/74    Weight from Last 3 Encounters:   17 218 lb (98.9 kg)   17 218 lb (98.9 kg)   17 217 lb (98.4 kg)               Primary Care Provider Office Phone #    [facility] [phone]       No address on file        Equal Access to Services     SELINA LÓPEZ AH: Hadii calos thompsono Sofranchescaali, waaxda luqadaha, qaybta kaalmada adeegyada, waxay idilesley hatch. So [facility] [phone].    ATENCIÓN: Si habla español, tiene a bruce disposición servicios gratuitos de asistencia lingüística. Llame al [phone].    We comply with applicable federal civil rights laws and [place] laws. We do not discriminate on the basis of race, color, national origin, age, disability sex, sexual orientation or gender identity.            Thank you!     Thank you for choosing [facility]  for your care. Our goal is always to provide you with excellent care. Hearing back from our patients is one way we can continue to improve our services. Please take a few minutes to complete the written survey that you may receive in the mail after your " visit with us. Thank you!             Your Updated Medication List - Protect others around you: Learn how to safely use, store and throw away your medicines at www.disposemymeds.org.          This list is accurate as of: 8/3/17 10:41 AM.  Always use your most recent med list.                   Brand Name Dispense Instructions for use Diagnosis    cyclobenzaprine 10 MG tablet    FLEXERIL    90 tablet    Take 1 tablet (10 mg) by mouth 3 times daily as needed for muscle spasms    Back muscle spasm, Acute bilateral low back pain without sciatica       HYDROcodone-acetaminophen 5-325 MG per tablet    NORCO    10 tablet    Take 1-2 tablets by mouth every 8 hours as needed for moderate to severe pain maximum 3tablet(s) per day    Acute bilateral low back pain without sciatica

## 2017-08-03 NOTE — PROGRESS NOTES
"47-year-old male with low back pain after lifting injury.  He initially had a lifting injury at work in late June, followed by a second injury in early July.  Both times, he has described eight out of 10, low aching pain, radiating side to side in the low back.  This is much worse with activity or lifting currently.  He has recently started physical therapy.         Past Medical History:   Diagnosis Date     CVA (cerebral infarction)      Patent foramen ovale      Umbilical hernia      Past Surgical History:   Procedure Laterality Date     CARDIAC SURGERY       ENT SURGERY       HERNIA REPAIR       ORTHOPEDIC SURGERY       Social History     Social History     Marital status:      Spouse name: N/A     Number of children: N/A     Years of education: N/A     Occupational History     Not on file.     Social History Main Topics     Smoking status: Current Every Day Smoker     Packs/day: 1.00     Years: 30.00     Types: Cigarettes     Smokeless tobacco: Never Used     Alcohol use Yes      Comment: rarely     Drug use: No     Sexual activity: Yes     Partners: Female     Other Topics Concern     Not on file     Social History Narrative     No family history on file.     ROS: 10 point ROS neg other than the symptoms noted above in the HPI.    Physical Exam  Temp 98  F (36.7  C) (Temporal)  Ht 1.753 m (5' 9\")  Wt 98.9 kg (218 lb)  BMI 32.19 kg/m2  HEENT:  Normocephalic, atraumatic.  PERRLA.  EOM s intact.  Visual fields full to gross exam  Neck:  Supple, non-tender, without lymphadenopathy.  Heart:  No peripheral edema  Lungs:  No SOB  Abdomen:  Non-distended.   Skin:  Warm and dry.  Extremities:  No edema, cyanosis or clubbing.    NEUROLOGICAL EXAMINATION:     Mental status:  Alert and Oriented x 3, speech is fluent.  Cranial nerves:  II-XII intact.   Motor:    Shoulder Abduction:  Right:  5/5   Left:  5/5  Biceps:                      Right:  5/5   Left:  5/5  Triceps:                     Right:  5/5   Left:  " 5/5  Wrist Extensors:       Right:  5/5   Left:  5/5  Wrist Flexors:           Right:  5/5   Left:  5/5  interosseus :            Right:  5/5   Left:  5/5   Hip Flexor:                Right: 5/5  Left:  5/5  Hip Adductor:             Right:  5/5  Left:  5/5  Hip Abductor:             Right:  5/5  Left:  5/5  Gastroc Soleus:        Right:  5/5  Left:  5/5  Tib/Ant:                      Right:  5/5  Left:  5/5  EHL:                     Right:  5/5  Left:  5/5  Sensation:  Intact  Reflexes:  Negative Babinski.  Negative Clonus.  Negative Maurice's.  Coordination:  Smooth finger to nose testing.   Negative pronator drift.  Smooth tandem walking.    A/P:  47-year-old male with low back pain after lifting injury    I had a lengthy discussion with the patient, reviewing the history, symptoms and imaging  We'll plan for him to get an MRI lumbar spine to evaluate for possible disc herniation  We discussed that based on the history, I'm more concerned for pelvic joint dislocation  If the MRI does not show significant pathology, we will likely refer him for possible pelvic manipulation with Dr. Alex

## 2017-08-04 ENCOUNTER — TELEPHONE (OUTPATIENT)
Dept: FAMILY MEDICINE | Facility: OTHER | Age: 47
End: 2017-08-04

## 2017-08-04 ENCOUNTER — OFFICE VISIT (OUTPATIENT)
Dept: FAMILY MEDICINE | Facility: OTHER | Age: 47
End: 2017-08-04
Payer: OTHER MISCELLANEOUS

## 2017-08-04 VITALS
TEMPERATURE: 98 F | SYSTOLIC BLOOD PRESSURE: 126 MMHG | HEART RATE: 74 BPM | DIASTOLIC BLOOD PRESSURE: 82 MMHG | BODY MASS INDEX: 32.14 KG/M2 | HEIGHT: 69 IN | RESPIRATION RATE: 16 BRPM | WEIGHT: 217 LBS

## 2017-08-04 DIAGNOSIS — F17.200 TOBACCO USE DISORDER: ICD-10-CM

## 2017-08-04 DIAGNOSIS — M54.50 ACUTE BILATERAL LOW BACK PAIN WITHOUT SCIATICA: Primary | ICD-10-CM

## 2017-08-04 PROCEDURE — 99213 OFFICE O/P EST LOW 20 MIN: CPT | Performed by: FAMILY MEDICINE

## 2017-08-04 ASSESSMENT — PAIN SCALES - GENERAL: PAINLEVEL: MODERATE PAIN (5)

## 2017-08-04 NOTE — NURSING NOTE
"Chief Complaint   Patient presents with     Back Pain     note for work     Panel Management     MyChart, Tdap, Tobacco cessation, Lipids       Initial /82  Pulse 74  Temp 98  F (36.7  C) (Temporal)  Resp 16  Ht 5' 9\" (1.753 m)  Wt 217 lb (98.4 kg)  BMI 32.05 kg/m2 Estimated body mass index is 32.05 kg/(m^2) as calculated from the following:    Height as of this encounter: 5' 9\" (1.753 m).    Weight as of this encounter: 217 lb (98.4 kg).  Medication Reconciliation: complete    "

## 2017-08-04 NOTE — LETTER
[facility]  [id number] [place] [id number]  Phone: [phone]    August 4, 2017        REPORT OF WORK ABILITY    NOTE TO EMPLOYEE: You must promptly provide a copy of this report to your  employer or worker's compensation insurer, and Qualified Rehabilitation Consultant.    Date: 8/4 /2017                     Employee Name: Alex Lynch         YOB: 1970  Medical Record Number: [id number]   Soc.Sec.No: [id number]  Employer: [employer]                Date of Injury: 07/07/17  Managed Care Organization / Insurance Company Name: UNKNOWN    Diagnosis:  Low back pain without sciatica due to injury  Low back strain     Work Related: yes    Permanent Partial Disability(PPD) likely: UNKNOWN    EMPLOYEE IS UNABLE TO WORK: 07/28/17- 08/21/17       TREATMENT PLAN/NOTES: Stretches and continue medications, continue with PT , referral placed to Orthopedist       Nidhi Guzman MD

## 2017-08-04 NOTE — MR AVS SNAPSHOT
After Visit Summary   8/4/2017    Alex Lynch    MRN: [id number]           Patient Information     Date Of Birth          1970        Visit Information        Provider Department      8/4/2017 7:30 AM Urszula Babin MD [facility]'s Diagnoses     Tobacco use disorder    -  1       Follow-ups after your visit        Your next 10 appointments already scheduled     Aug 08, 2017  1:15 PM CDT   MR LUMBAR SPINE W/O CONTRAST with PHMR1   [facility] ([facility])    [address] [place] [id number] [phone]           Take your medicines as usual, unless your doctor tells you not to. Bring a list of your current medicines to your exam (including vitamins, minerals and over-the-counter drugs). Also bring the results of similar scans you may have had.  Please remove any body piercings and hair extensions before you arrive.  Follow your doctor s orders. If you do not, we may have to postpone your exam.  You will not have contrast for this exam. You do not need to do anything special to prepare.  The MRI machine uses a strong magnet. Please wear clothes without metal (snaps, zippers). A sweatsuit works well, or we may give you a hospital gown.   **IMPORTANT** THE INSTRUCTIONS BELOW ARE ONLY FOR THOSE PATIENTS WHO HAVE BEEN TOLD THEY WILL RECEIVE SEDATION OR GENERAL ANESTHESIA DURING THEIR MRI PROCEDURE:  IF YOU WILL RECEIVE SEDATION (take medicine to help you relax during your exam):   You must get the medicine from your doctor before you arrive. Bring the medicine to the exam. Do not take it at home.   Arrive one hour early. Bring someone who can take you home after the test. Your medicine will make you sleepy. After the exam, you may not drive, take a bus or take a taxi by yourself.   No eating 8 hours before your exam. You may have clear liquids up until 4 hours before your exam. (Clear liquids include water, clear tea,  "black coffee and fruit juice without pulp.)  IF YOU WILL RECEIVE ANESTHESIA (be asleep for your exam):   Arrive 1 1/2 hours early. Bring someone who can take you home after the test. You may not drive, take a bus or take a taxi by yourself.   No eating 8 hours before your exam. You may have clear liquids up until 4 hours before your exam. (Clear liquids include water, clear tea, black coffee and fruit juice without pulp.)   You will spend four to five hours in the recovery room.  Please call the Imaging Department at your exam site with any questions.              Future tests that were ordered for you today     Open Future Orders        Priority Expected Expires Ordered    MR Lumbar Spine w/o Contrast Routine  8/3/2018 8/3/2017            Who to contact     If you have questions or need follow up information about today's clinic visit or your schedule please contact [facility] directly at [phone].  Normal or non-critical lab and imaging results will be communicated to you by [employer], letter or phone within 4 business days after the clinic has received the results. If you do not hear from us within 7 days, please contact the clinic through [employer] or phone. If you have a critical or abnormal lab result, we will notify you by phone as soon as possible.  Submit refill requests through [employer] or call your pharmacy and they will forward the refill request to us. Please allow 3 business days for your refill to be completed.          Additional Information About Your Visit        [employer] Information     [employer] lets you send messages to your doctor, view your test results, renew your prescriptions, schedule appointments and more. To sign up, go to [email or web address] . Click on \"Log in\" on the left side of the screen, which will take you to the Welcome page. Then click on \"Sign up Now\" on the right side of the page.     You will be asked to enter the access code listed below, as well as some " "personal information. Please follow the directions to create your username and password.     Your access code is: [email or web address]  Expires: 10/10/2017  9:33 AM     Your access code will  in 90 days. If you need help or a new code, please call your [facility] clinic or [phone].        Care EveryWhere ID     This is your Care EveryWhere ID. This could be used by other organizations to access your [facility] medical records  [id number]        Your Vitals Were     Pulse Temperature Respirations Height BMI (Body Mass Index)       74 98  F (36.7  C) (Temporal) 16 5' 9\" (1.753 m) 32.05 kg/m2        Blood Pressure from Last 3 Encounters:   17 126/82   17 128/70   17 130/68    Weight from Last 3 Encounters:   17 217 lb (98.4 kg)   17 218 lb (98.9 kg)   17 218 lb (98.9 kg)              We Performed the Following     TOBACCO CESSATION - FOR HEALTH MAINTENANCE        Primary Care Provider Office Phone #    [facility]av[facility] [phone]       No address on file        Equal Access to Services     SELINA LÓPEZ : Hadii calos self hadasho Sofranchescaali, waaxda luqadaha, qaybta kaalmada adeegyada, aydee hatch. [facility] [phone].    ATENCIÓN: Si habla español, tiene a bruce disposición servicios gratuitos de asistencia lingüística. [facility] [phone].    We comply with applicable federal civil rights laws and [place] laws. We do not discriminate on the basis of race, color, national origin, age, disability sex, sexual orientation or gender identity.            Thank you!     Thank you for choosing [facility]  for your care. Our goal is always to provide you with excellent care. Hearing back from our patients is one way we can continue to improve our services. Please take a few minutes to complete the written survey that you may receive in the mail after your visit with us. Thank you!             Your Updated Medication List - Protect " others around you: Learn how to safely use, store and throw away your medicines at www.disposemymeds.org.          This list is accurate as of: 8/4/17  7:59 AM.  Always use your most recent med list.                   Brand Name Dispense Instructions for use Diagnosis    cyclobenzaprine 10 MG tablet    FLEXERIL    90 tablet    Take 1 tablet (10 mg) by mouth 3 times daily as needed for muscle spasms    Back muscle spasm, Acute bilateral low back pain without sciatica       HYDROcodone-acetaminophen 5-325 MG per tablet    NORCO    10 tablet    Take 1-2 tablets by mouth every 8 hours as needed for moderate to severe pain maximum 3tablet(s) per day    Acute bilateral low back pain without sciatica

## 2017-08-04 NOTE — TELEPHONE ENCOUNTER
Not sure why this was sent to me. Looks like patient is [facility] patient. Please forward.    Champ Pace PA-C  [facility]

## 2017-08-04 NOTE — TELEPHONE ENCOUNTER
Gregorio from [facility] is calling she has a release of information for the patient to send patient's records to our clinic she wondering what kind of information you are needing because he has a lot of records. Please call her at [phone].    Thank you Irma

## 2017-08-04 NOTE — PATIENT INSTRUCTIONS
Back Care Tips    Caring for your back  These are things you can do to prevent a recurrence of acute back pain and to reduce symptoms from chronic back pain:    Maintain a healthy weight. If you are overweight, losing weight will help most types of back pain.    Exercise is an important part of recovery from most types of back pain. The muscles behind and in front of the spine support the back. This means strengthening both the back muscles and the abdominal muscles will provide better support for your spine.     Swimming and brisk walking are good overall exercises to improve your fitness level.    Practice safe lifting methods (below).    Practice good posture when sitting, standing and walking. Avoid prolonged sitting. This puts more stress on the lower back than standing or walking.    Wear quality shoes with sufficient arch support. Foot and ankle alignment can affect back symptoms. Women should avoid wearing high heels.    Therapeutic massage can help relax the back muscles without stretching them.    During the first 24 to 72 hours after an acute injury or flare-up of chronic back pain, apply an ice pack to the painful area for 20 minutes and then remove it for 20 minutes, over a period of 60 to 90 minutes, or several times a day. As a safety precaution, do not use a heating pad at bedtime. Sleeping on a heating pad can lead to skin burns or tissue damage.    You can alternate ice and heat therapies.  Medications  Talk to your healthcare provider before using medicines, especially if you have other medical problems or are taking other medicines.    You may use acetaminophen or ibuprofen to control pain, unless your healthcare provider prescribed other pain medicine. If you have chronic conditions like diabetes, liver or kidney disease, stomach ulcers, or gastrointestinal bleeding, or are taking blood thinners, talk with your healthcare provider before taking any medicines.    Be careful if you are given  prescription pain medicines, narcotics, or medicine for muscle spasm. They can cause drowsiness, affect your coordination, reflexes, and judgment. Do not drive or operate heavy machinery while taking these types of medicines. Take prescription pain medicine only as prescribed by your healthcare provider.  Lumbar stretch  Here is a simple stretching exercise that will help relax muscle spasm and keep your back more limber. If exercise makes your back pain worse, don t do it.    Lie on your back with your knees bent and both feet on the ground.    Slowly raise your left knee to your chest as you flatten your lower back against the floor. Hold for 5 seconds.    Relax and repeat the exercise with your right knee.    Do 10 of these exercises for each leg.  Safe lifting method    Don t bend over at the waist to lift an object off the floor.  Instead, bend your knees and hips in a squat.     Keep your back and head upright    Hold the object close to your body, directly in front of you.    Straighten your legs to lift the object.     Lower the object to the floor in the reverse fashion.    If you must slide something across the floor, push it.  Posture tips  Sitting  Sit in chairs with straight backs or low-back support. Keep your knees lower than your hips, with your feet flat on the floor.  When driving, sit up straight. Adjust the seat forward so you are not leaning toward the steering wheel.  A small pillow or rolled towel behind your lower back may help if you are driving long distances.   Standing  When standing for long periods, shift most of your weight to one leg at a time. Alternate legs every few minutes.   Sleeping  The best way to sleep is on your side with your knees bent. Put a low pillow under your head to support your neck in a neutral spine position. Avoid thick pillows that bend your neck to one side. Put a pillow between your legs to further relax your lower back. If you sleep on your back, put pillows  under your knees to support your legs in a slightly flexed position. Use a firm mattress. If your mattress sags, replace it, or use a 1/2-inch plywood board under the mattress to add support.  Follow-up care  Follow up with your healthcare provider, or as advised.  If X-rays, a CT scan or an MRI scan were taken, they will be reviewed by a radiologist. You will be notified of any new findings that may affect your care.  Call 911  Seek emergency medical care if any of the following occur:    Trouble breathing    Confusion    Very drowsy    Fainting or loss of consciousness    Rapid or very slow heart rate    Loss of  bowel or bladder control  When to seek medical care  Call your healthcare provider if any of the following occur:    Pain becomes worse or spreads to your arms or legs    Weakness or numbness in one or both arms or legs    Numbness in the groin area  Date Last Reviewed: 6/1/2016 2000-2017 [employer]. [address] [place] [address]. All rights reserved. This information is not intended as a substitute for professional medical care. Always follow your healthcare professional's instructions.        Relieving Back Pain  Back pain is a common problem. You can strain back muscles by lifting too much weight or just by moving the wrong way. Back strain can be uncomfortable, even painful. And it can take weeks or months to improve. To help yourself feel better and prevent future back strains, try these tips.  Important Note: Do not give aspirin to children or teens without first discussing it with your healthcare provider.      ? Ice    Ice reduces muscle pain and swelling. It helps most during the first 24 to 48 hours after an injury.    Wrap an ice pack or a bag of frozen peas in a thin towel. (Never place ice directly on your skin.)    Place the ice where your back hurts the most.    Don t ice for more than 20 minutes at a time.    You can use ice several times a  day.  ? Medicines  Over-the-counter pain relievers can include acetaminophen and anti-inflammatory medicines, which includes aspirin or ibuprofen. They can help ease discomfort. Some also reduce swelling.    Tell your healthcare provider about any medicines you are already taking.    Take medicines only as directed.  ? Heat  After the first 48 hours, heat can relax sore muscles and improve blood flow.    Try a warm bath or shower. Or use a heating pad set on low. To prevent a burn, keep a cloth between you and the heating pad.    Don t use a heating pad for more than 15 minutes at a time. Never sleep on a heating pad.  Date Last Reviewed: 9/1/2015 2000-2017 [employer]. [address] [place] [address], [place] [address]. All rights reserved. This information is not intended as a substitute for professional medical care. Always follow your healthcare professional's instructions.

## 2017-08-04 NOTE — TELEPHONE ENCOUNTER
Called patient and he is not sure why [facility] needs records? He was seen by Dr. Otoole for his back and ordered Lumbar MRI?  I saw him today for extending his workability letter. Not seen patient for over 4 years.  Thank you.  Electronically signed:  Urszula Babin M.D.

## 2017-08-14 ENCOUNTER — HOSPITAL ENCOUNTER (OUTPATIENT)
Dept: MRI IMAGING | Facility: CLINIC | Age: 47
Discharge: HOME | End: 2017-08-14
Attending: NEUROLOGICAL SURGERY | Admitting: NEUROLOGICAL SURGERY
Payer: OTHER MISCELLANEOUS

## 2017-08-14 DIAGNOSIS — M54.50 ACUTE BILATERAL LOW BACK PAIN WITHOUT SCIATICA: ICD-10-CM

## 2017-08-14 PROCEDURE — 72148 MRI LUMBAR SPINE W/O DYE: CPT

## 2017-08-17 ENCOUNTER — TELEPHONE (OUTPATIENT)
Dept: NEUROSURGERY | Facility: CLINIC | Age: 47
End: 2017-08-17

## 2017-08-17 ENCOUNTER — TELEPHONE (OUTPATIENT)
Dept: FAMILY MEDICINE | Facility: OTHER | Age: 47
End: 2017-08-17

## 2017-08-17 DIAGNOSIS — M53.3 SI (SACROILIAC) JOINT DYSFUNCTION: Primary | ICD-10-CM

## 2017-08-17 NOTE — TELEPHONE ENCOUNTER
[facility] phone call message- patient requests results:    Name of test or procedure: MRI  Date of test of procedure: a few weeks ago  Location of the test or procedure: [facility]  OK to leave the result message on voice mail or with a family member? NO    Additional comments: Patients work restrictions are up tomorrow, what should he do?    Call taken on 8/17/2017 at 1:47 PM by Sandy Staley

## 2017-08-17 NOTE — TELEPHONE ENCOUNTER
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 Freeman Alex for pelvic joint dysfunction. He can continue on current restrictions until his appt with Dr. Alex. Updated work letter written. Patient will pick this up at  of [facility]. Patient will call back with any further questions.

## 2017-08-22 ENCOUNTER — MEDICAL CORRESPONDENCE (OUTPATIENT)
Dept: HEALTH INFORMATION MANAGEMENT | Facility: CLINIC | Age: 47
End: 2017-08-22

## 2017-08-22 ENCOUNTER — TRANSFERRED RECORDS (OUTPATIENT)
Dept: HEALTH INFORMATION MANAGEMENT | Facility: CLINIC | Age: 47
End: 2017-08-22

## 2017-09-01 ENCOUNTER — HOSPITAL ENCOUNTER (OUTPATIENT)
Dept: PHYSICAL THERAPY | Facility: CLINIC | Age: 47
Setting detail: THERAPIES SERIES
End: 2017-09-01
Attending: FAMILY MEDICINE
Payer: OTHER MISCELLANEOUS

## 2017-09-01 PROCEDURE — 40000718 ZZHC STATISTIC PT DEPARTMENT ORTHO VISIT: Performed by: PHYSICAL THERAPIST

## 2017-09-01 PROCEDURE — 97162 PT EVAL MOD COMPLEX 30 MIN: CPT | Mod: GP | Performed by: PHYSICAL THERAPIST
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
## 2017-09-08 ENCOUNTER — TRANSFERRED RECORDS (OUTPATIENT)
Dept: HEALTH INFORMATION MANAGEMENT | Facility: CLINIC | Age: 47
End: 2017-09-08

## 2017-09-18 ENCOUNTER — HOSPITAL ENCOUNTER (OUTPATIENT)
Dept: PHYSICAL THERAPY | Facility: CLINIC | Age: 47
Setting detail: THERAPIES SERIES
End: 2017-09-18
Attending: PREVENTIVE MEDICINE
Payer: OTHER MISCELLANEOUS

## 2017-09-18 PROCEDURE — 97140 MANUAL THERAPY 1/> REGIONS: CPT | Mod: GP | Performed by: PHYSICAL THERAPIST

## 2017-09-18 PROCEDURE — 40000718 ZZHC STATISTIC PT DEPARTMENT ORTHO VISIT: Performed by: PHYSICAL THERAPIST

## 2017-09-18 PROCEDURE — 97110 THERAPEUTIC EXERCISES: CPT | Mod: GP | Performed by: PHYSICAL THERAPIST

## 2017-09-18 PROCEDURE — 97112 NEUROMUSCULAR REEDUCATION: CPT | Mod: GP | Performed by: PHYSICAL THERAPIST

## 2017-09-20 ENCOUNTER — HOSPITAL ENCOUNTER (OUTPATIENT)
Dept: PHYSICAL THERAPY | Facility: CLINIC | Age: 47
Setting detail: THERAPIES SERIES
End: 2017-09-20
Attending: PREVENTIVE MEDICINE
Payer: OTHER MISCELLANEOUS

## 2017-09-20 PROCEDURE — 97110 THERAPEUTIC EXERCISES: CPT | Mod: GP | Performed by: PHYSICAL THERAPIST

## 2017-09-20 PROCEDURE — 40000718 ZZHC STATISTIC PT DEPARTMENT ORTHO VISIT: Performed by: PHYSICAL THERAPIST

## 2017-09-20 PROCEDURE — 97112 NEUROMUSCULAR REEDUCATION: CPT | Mod: GP | Performed by: PHYSICAL THERAPIST

## 2017-09-21 NOTE — ADDENDUM NOTE
Encounter addended by: Telly Moore, PT on: 9/21/2017 10:14 AM<BR>     Actions taken: Sign clinical note, Episode resolved

## 2017-09-21 NOTE — PROGRESS NOTES
Outpatient Physical Therapy Discharge Note     Patient: Alex Lynch  : 1970    Beginning/End Dates of Reporting Period:  17 to 2017 (pt seen for 3 PT visits from  to 17, no show on 8/3/17)    Referring Provider: Nidhi Guzman MD    Therapy Diagnosis: mechanical L>R LBP with left lower extremity referral, decreased functional level     Client Self Report: Pt had a flare up when he was washing his dog and pt was flexed at his back and pulled forward. Sees Dr Carney tomorrow. 6/10 LBP today when reporting on 17. L foot has a warm sensation and 1st toe 3/10.     Objective Measurements:  Objective Measure: average LBP and L LE pain (3-4/10 at eval on 17)   Details: in prone today with pillow under pelvis sx to 3/10  Objective Measure: frequency of LBP and L LE pain (100% at eval)   Details: constant  Objective Measure: number of pain relieving exercises per day (PREP)  Details: none last 2 days  Objective Measure: effect of PREP   Details: decreases sx today from a 6 to a 3/10 in low back.   Objective Measure: number of times waking at night due to pain (3-5 times per night at eval on 17)   Details: not asked today        Goals:  Goal Identifier pain   Goal Description pt will note a decrease in average LBP from a 3-4/10 to a 2/10 or less so he will no longer wake 3-5 times per night due to pain but 1 or less consistently   Target Date 17   Date Met      Progress:     Goal Identifier function   Goal Description pt will note further reduction in symptoms and carry over to improved functional level as reported by Katy moving to low category and SAMI from 50% to 30% or less   Target Date 17   Date Met      Progress:       Progress Toward Goals:   Not assessed this period. Pt no show for 8/3/17 visit and then continues PT in [place].    Plan:  Discharge from therapy at [facility], new eval done at [facility].     Discharge:    Reason for Discharge: Patient chooses to discontinue  therapy at [facility].    Equipment Issued: none    Discharge Plan: Patient to continue home program.

## 2017-09-25 ENCOUNTER — HOSPITAL ENCOUNTER (OUTPATIENT)
Dept: PHYSICAL THERAPY | Facility: CLINIC | Age: 47
Setting detail: THERAPIES SERIES
End: 2017-09-25
Attending: PREVENTIVE MEDICINE
Payer: OTHER MISCELLANEOUS

## 2017-09-25 PROCEDURE — 97112 NEUROMUSCULAR REEDUCATION: CPT | Mod: GP | Performed by: PHYSICAL THERAPIST

## 2017-09-25 PROCEDURE — 97110 THERAPEUTIC EXERCISES: CPT | Mod: GP | Performed by: PHYSICAL THERAPIST

## 2017-09-25 PROCEDURE — 40000718 ZZHC STATISTIC PT DEPARTMENT ORTHO VISIT: Performed by: PHYSICAL THERAPIST

## 2017-10-02 ENCOUNTER — HOSPITAL ENCOUNTER (OUTPATIENT)
Dept: PHYSICAL THERAPY | Facility: CLINIC | Age: 47
Setting detail: THERAPIES SERIES
End: 2017-10-02
Attending: PREVENTIVE MEDICINE
Payer: OTHER MISCELLANEOUS

## 2017-10-02 PROCEDURE — 97112 NEUROMUSCULAR REEDUCATION: CPT | Mod: GP | Performed by: PHYSICAL THERAPIST

## 2017-10-02 PROCEDURE — 40000718 ZZHC STATISTIC PT DEPARTMENT ORTHO VISIT: Performed by: PHYSICAL THERAPIST

## 2017-10-02 PROCEDURE — 97110 THERAPEUTIC EXERCISES: CPT | Mod: GP | Performed by: PHYSICAL THERAPIST

## 2017-10-02 NOTE — PROGRESS NOTES
Outpatient Physical Therapy Discharge Note     Patient: Alex Lynch  : 1970    Beginning/End Dates of Reporting Period:  17 to 10/2/2017    Referring Provider: Dr. Freeman Alex MD    Therapy Diagnosis: Lumbar sprain, nonallopathic lesion of pelvic region     Client Self Report: Pt reports had follow up with doctor, and placed him on 6 more weeks of 30lb lifting restriction. And taught him another self-mobilizations. Was sitting on therapy ball while watching tv.  Will be getting a recumbent pedaling bike tomorrow for use at home. Overall since starting therapy pt reports 80% improvement. Pt feels confidence with independence of HEP and using therapy ball and recumbent bike for continued exercise.    Objective Measurements:  Objective Measure: Special tests  Details: (+) L forward bend, (-) stork B, Symmetrical PSIS and ASIS in standing and supine     Objective Measure: Lumbar Spine AROM  Details: 100% flexion, extension and B SB all painfree    Objective Measure: MMT  Details: Hip ABD 5/5 B, hip flexion 5/5 B, Knee extension and flexion 5/5 B, Kierra DF 5/5 B    Objective Measure: Lifting mechanics  Details: Pt able to demonstrate proper floor <> waist lifting mechanics with 0-35lbs, requires 20% verbal cues to correct mechanics regarding wide FILIBERTO and decresaing anterior knee excursion         Outcome Measures (most recent score):  Katy STarT Sub-Score (Q5-9): 1  Katy STarT Total Score (all 9): 2  Low Risk (Improved from Medium Risk 5/9)  Oswestry Score (%): 6.67 % (improved from 28%)      Goals:  Goal Identifier Pain    Goal Description Pt will report a decrease in average back pain to 1/10 or less with working typical shift in order to meet goal of returning to PLOF.   Target Date 17   Date Met  17   Progress:     Goal Identifier Function   Goal Description Pt will report an improvement in function and participation in daily activities as evidenced by a decrease in SAMI to less than  20%.   Target Date 09/29/17   Date Met  10/02/17   Progress:     Goal Identifier Sitting without support   Goal Description Pt will show an improvement in core strength and postural stability by being able to sit without back support for 5 minutes without and increase in sx in order to meet patient's goal of sitting on barstool.   Target Date 09/29/17   Date Met  10/02/17   Progress:     Progress Toward Goals:   Progress this reporting period: Pt has met all goals. Pt's pain has abolished. Pt has increased strength in core and BLE. Pt demonstrates full painfree spine AROM. Pt still has (+) forward bend test on L PSIS however pt has program to manage this at home. Pt is able to sit without back support for 10+ min. Pt has improved SAMI from 25% to 5% disability. Pt has demonstrated improved lifting mechanics, but still shows some decreased FILIBERTO and anterior knee excursion that needs occasional verbal cues to remind to maintain proper mechanics. Pt is independent with HEP.        Plan:  Discharge from therapy.    Discharge:    Reason for Discharge: Patient has met all goals.    Equipment Issued: therapy band    Discharge Plan: Patient to continue home program.

## 2017-11-06 ENCOUNTER — TRANSFERRED RECORDS (OUTPATIENT)
Dept: HEALTH INFORMATION MANAGEMENT | Facility: CLINIC | Age: 47
End: 2017-11-06

## 2017-12-18 ENCOUNTER — TRANSFERRED RECORDS (OUTPATIENT)
Dept: HEALTH INFORMATION MANAGEMENT | Facility: CLINIC | Age: 47
End: 2017-12-18

## 2018-01-02 NOTE — PROGRESS NOTES
SUBJECTIVE:   CC: Alex Lynch is an 47 year old male who presents for preventative health visit.     Physical   Annual:     Getting at least 3 servings of Calcium per day::  NO    Bi-annual eye exam::  NO    Dental care twice a year::  NO    Sleep apnea or symptoms of sleep apnea::  Daytime drowsiness    Diet::  Regular (no restrictions)    Frequency of exercise::  4-5 days/week    Duration of exercise::  15-30 minutes    Taking medications regularly::  Not Applicable    Medication side effects::  Not applicable    Additional concerns today::  YES            Today's PHQ-2 Score: PHQ-2 ( 1999 [employer]) 1/5/2018   Q1: Little interest or pleasure in doing things 0   Q2: Feeling down, depressed or hopeless 0   PHQ-2 Score 0   Q1: Little interest or pleasure in doing things Not at all   Q2: Feeling down, depressed or hopeless Not at all   PHQ-2 Score 0       Abuse: Current or Past(Physical, Sexual or Emotional)- No  Do you feel safe in your environment - Yes    Social History   Substance Use Topics     Smoking status: Current Every Day Smoker     Packs/day: 1.00     Years: 30.00     Types: Cigarettes     Smokeless tobacco: Never Used     Alcohol use Yes      Comment: rarely     Alcohol Use 1/5/2018   If you drink alcohol, do you typically have greater than 3 drinks per day OR greater than 7 drinks per week?   Yes   AUDIT SCORE  7     AUDIT - Alcohol Use Disorders Identification Test - Reproduced from the World Health Organization Audit 2001 (Second Edition) 1/5/2018   1.  How often do you have a drink containing alcohol? 2 to 4 times a month   2.  How many drinks containing alcohol do you have on a typical day when you are drinking? 5 or 6   3.  How often do you have five or more drinks on one occasion? Weekly   4.  How often during the last year have you found that you were not able to stop drinking once you had started? Never   5.  How often during the last year have you failed to do what was normally expected of  you because of drinking? Never   6.  How often during the last year have you needed a first drink in the morning to get yourself going after a heavy drinking session? Never   7.  How often during the last year have you had a feeling of guilt or remorse after drinking? Never   8.  How often during the last year have you been unable to remember what happened the night before because of your drinking? Never   9.  Have you or someone else been injured because of your drinking? No   10. Has a relative, friend, doctor or other health care worker been concerned about your drinking or suggested you cut down? No   TOTAL SCORE 7         Last PSA: No results found for: PSA    Reviewed orders with patient. Reviewed health maintenance and updated orders accordingly - Yes  Labs reviewed in EPIC  BP Readings from Last 3 Encounters:   01/05/18 136/82   08/04/17 126/82   07/28/17 128/70    Wt Readings from Last 3 Encounters:   01/05/18 218 lb 6.4 oz (99.1 kg)   08/04/17 217 lb (98.4 kg)   08/03/17 218 lb (98.9 kg)                  Patient Active Problem List   Diagnosis     Tobacco use disorder     Hyperlipidemia LDL goal <130     Hypertension goal BP (blood pressure) < 140/90     Obesity, Class I, BMI 30-34.9     History of substance abuse     Past Surgical History:   Procedure Laterality Date     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 No pertinent family history.      No current outpatient prescriptions on file.     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     ENT SURGERY       HERNIA REPAIR       ORTHOPEDIC SURGERY         Review of Systems  C: NEGATIVE for fever, chills, change in weight  I: NEGATIVE for worrisome rashes, moles or lesions  E: NEGATIVE for vision changes or irritation  ENT: NEGATIVE for ear, mouth and throat problems  R: NEGATIVE for significant cough or SOB  CV: NEGATIVE for chest pain, palpitations or peripheral edema  GI: NEGATIVE for nausea, abdominal pain, heartburn, or change in bowel habits   male: negative for dysuria, hematuria, decreased urinary stream, erectile dysfunction, urethral discharge  M: NEGATIVE for significant arthralgias or myalgia  N: NEGATIVE for weakness, dizziness or paresthesias  P: NEGATIVE for changes in mood or affect    OBJECTIVE:   /86 (Cuff Size: Adult Large)  Pulse 72  Temp 97.7  F (36.5  C) (Temporal)  Resp 18  Ht 5' 6.75\" (1.695 m)  Wt 218 lb 6.4 oz (99.1 kg)  BMI 34.46 kg/m2    Physical Exam  GENERAL: healthy, alert and no distress  EYES: Eyes grossly normal to inspection, PERRL and conjunctivae and sclerae normal  HENT: ear canals and TM's normal, nose and mouth without ulcers or lesions  NECK: no adenopathy, no asymmetry, masses, or scars and thyroid normal to palpation  RESP: lungs wheezy on the right and decreased to auscultation on the left - no rales, rhonchi   CV: regular rate and rhythm, normal S1 S2, no S3 or S4, no murmur, click or rub, no peripheral edema and peripheral pulses strong  ABDOMEN: soft, nontender, no hepatosplenomegaly, no masses and bowel sounds normal  MS: no gross musculoskeletal defects " noted, no edema  SKIN: no suspicious lesions or rashes  NEURO: Normal strength and tone, mentation intact and speech normal  PSYCH: mentation appears normal, affect normal/bright    ASSESSMENT/PLAN:   1. Routine general medical examination at a health care facility  - Comprehensive metabolic panel (BMP + Alb, Alk Phos, ALT, AST, Total. Bili, TP)  - Lipid panel reflex to direct LDL Fasting  - CBC with platelets and differential  - PSA, screen    2. Hypertension goal BP (blood pressure) < 140/90  Second test WNL    3. Tobacco use disorder  - XR Chest 2 Views; Future  - General PFT Lab (Please always keep checked); Future  - Pulmonary Function Test; Future    4. Hyperlipidemia LDL goal <130  - Comprehensive metabolic panel (BMP + Alb, Alk Phos, ALT, AST, Total. Bili, TP)  - Lipid panel reflex to direct LDL Fasting  - NUTRITION REFERRAL    5. Obesity, Class I, BMI 30-34.9  - NUTRITION REFERRAL    6. History of substance abuse  Multiple (meth, cocaine, marijuana)    7. Asbestos exposure  - XR Chest 2 Views; Future  - General PFT Lab (Please always keep checked); Future  - Pulmonary Function Test; Future    8. Abnormal lung sounds  - XR Chest 2 Views; Future  - General PFT Lab (Please always keep checked); Future  - Pulmonary Function Test; Future    COUNSELING:   Reviewed preventive health counseling, as reflected in patient instructions       Regular exercise       Healthy diet/nutrition       Vision screening       Hearing screening    BP Screening:   Last 3 BP Readings:    BP Readings from Last 3 Encounters:   01/05/18 136/82   08/04/17 126/82   07/28/17 128/70       The following was recommended to the patient:  Re-screen BP within a year and recommended lifestyle modifications       reports that he has been smoking Cigarettes.  He has a 30.00 pack-year smoking history. He has never used smokeless tobacco.  Tobacco Cessation Action Plan: Information offered: Patient not interested at this time  Estimated body mass  "index is 34.46 kg/(m^2) as calculated from the following:    Height as of this encounter: 5' 6.75\" (1.695 m).    Weight as of this encounter: 218 lb 6.4 oz (99.1 kg).   Weight management plan: Discussed healthy diet and exercise guidelines and patient will follow up in 6 months in clinic to re-evaluate.    Counseling Resources:  ATP IV Guidelines  Pooled Cohorts Equation Calculator  FRAX Risk Assessment  ICSI Preventive Guidelines  Dietary Guidelines for Americans, 2010  USDA's MyPlate  ASA Prophylaxis  Lung CA Screening    Shin Mcdaniel PA-C  [facility] [place]  Answers for HPI/ROS submitted by the patient on 1/5/2018   PHQ-2 Score: 0    "

## 2018-01-05 ENCOUNTER — OFFICE VISIT (OUTPATIENT)
Dept: FAMILY MEDICINE | Facility: OTHER | Age: 48
End: 2018-01-05
Payer: COMMERCIAL

## 2018-01-05 ENCOUNTER — TELEPHONE (OUTPATIENT)
Dept: FAMILY MEDICINE | Facility: OTHER | Age: 48
End: 2018-01-05

## 2018-01-05 ENCOUNTER — RADIANT APPOINTMENT (OUTPATIENT)
Dept: GENERAL RADIOLOGY | Facility: OTHER | Age: 48
End: 2018-01-05
Attending: PHYSICIAN ASSISTANT
Payer: COMMERCIAL

## 2018-01-05 VITALS
DIASTOLIC BLOOD PRESSURE: 82 MMHG | HEIGHT: 67 IN | HEART RATE: 72 BPM | BODY MASS INDEX: 34.28 KG/M2 | WEIGHT: 218.4 LBS | RESPIRATION RATE: 18 BRPM | TEMPERATURE: 97.7 F | SYSTOLIC BLOOD PRESSURE: 136 MMHG

## 2018-01-05 DIAGNOSIS — E78.5 HYPERLIPIDEMIA LDL GOAL <130: Primary | ICD-10-CM

## 2018-01-05 DIAGNOSIS — R09.89 ABNORMAL LUNG SOUNDS: ICD-10-CM

## 2018-01-05 DIAGNOSIS — F17.200 TOBACCO USE DISORDER: ICD-10-CM

## 2018-01-05 DIAGNOSIS — Z77.090 ASBESTOS EXPOSURE: ICD-10-CM

## 2018-01-05 DIAGNOSIS — E78.5 HYPERLIPIDEMIA LDL GOAL <130: ICD-10-CM

## 2018-01-05 DIAGNOSIS — E66.811 OBESITY, CLASS I, BMI 30-34.9: ICD-10-CM

## 2018-01-05 DIAGNOSIS — F19.11 HISTORY OF SUBSTANCE ABUSE (H): ICD-10-CM

## 2018-01-05 DIAGNOSIS — Z00.00 ROUTINE GENERAL MEDICAL EXAMINATION AT A HEALTH CARE FACILITY: Primary | ICD-10-CM

## 2018-01-05 DIAGNOSIS — I10 HYPERTENSION GOAL BP (BLOOD PRESSURE) < 140/90: ICD-10-CM

## 2018-01-05 LAB
ALBUMIN SERPL-MCNC: 3.7 G/DL (ref 3.4–5)
ALP SERPL-CCNC: 80 U/L (ref 40–150)
ALT SERPL W P-5'-P-CCNC: 43 U/L (ref 0–70)
ANION GAP SERPL CALCULATED.3IONS-SCNC: 8 MMOL/L (ref 3–14)
AST SERPL W P-5'-P-CCNC: 24 U/L (ref 0–45)
BASOPHILS # BLD AUTO: 0 10E9/L (ref 0–0.2)
BASOPHILS NFR BLD AUTO: 0.4 %
BILIRUB SERPL-MCNC: 0.5 MG/DL (ref 0.2–1.3)
BUN SERPL-MCNC: 14 MG/DL (ref 7–30)
CALCIUM SERPL-MCNC: 8.6 MG/DL (ref 8.5–10.1)
CHLORIDE SERPL-SCNC: 102 MMOL/L (ref 94–109)
CHOLEST SERPL-MCNC: 270 MG/DL
CO2 SERPL-SCNC: 29 MMOL/L (ref 20–32)
CREAT SERPL-MCNC: 1.01 MG/DL (ref 0.66–1.25)
DIFFERENTIAL METHOD BLD: NORMAL
EOSINOPHIL # BLD AUTO: 0.4 10E9/L (ref 0–0.7)
EOSINOPHIL NFR BLD AUTO: 5 %
ERYTHROCYTE [DISTWIDTH] IN BLOOD BY AUTOMATED COUNT: 14 % (ref 10–15)
GFR SERPL CREATININE-BSD FRML MDRD: 78 ML/MIN/1.7M2
GLUCOSE SERPL-MCNC: 101 MG/DL (ref 70–99)
HCT VFR BLD AUTO: 48.7 % (ref 40–53)
HDLC SERPL-MCNC: 45 MG/DL
HGB BLD-MCNC: 16.2 G/DL (ref 13.3–17.7)
LDLC SERPL CALC-MCNC: 196 MG/DL
LYMPHOCYTES # BLD AUTO: 2.1 10E9/L (ref 0.8–5.3)
LYMPHOCYTES NFR BLD AUTO: 28.5 %
MCH RBC QN AUTO: 31.4 PG (ref 26.5–33)
MCHC RBC AUTO-ENTMCNC: 33.3 G/DL (ref 31.5–36.5)
MCV RBC AUTO: 94 FL (ref 78–100)
MONOCYTES # BLD AUTO: 0.7 10E9/L (ref 0–1.3)
MONOCYTES NFR BLD AUTO: 9 %
NEUTROPHILS # BLD AUTO: 4.2 10E9/L (ref 1.6–8.3)
NEUTROPHILS NFR BLD AUTO: 57.1 %
NONHDLC SERPL-MCNC: 225 MG/DL
PLATELET # BLD AUTO: 206 10E9/L (ref 150–450)
POTASSIUM SERPL-SCNC: 4.4 MMOL/L (ref 3.4–5.3)
PROT SERPL-MCNC: 7.2 G/DL (ref 6.8–8.8)
PSA SERPL-ACNC: 0.43 UG/L (ref 0–4)
RBC # BLD AUTO: 5.16 10E12/L (ref 4.4–5.9)
SODIUM SERPL-SCNC: 139 MMOL/L (ref 133–144)
TRIGL SERPL-MCNC: 146 MG/DL
WBC # BLD AUTO: 7.4 10E9/L (ref 4–11)

## 2018-01-05 PROCEDURE — 80053 COMPREHEN METABOLIC PANEL: CPT | Performed by: PHYSICIAN ASSISTANT

## 2018-01-05 PROCEDURE — 36415 COLL VENOUS BLD VENIPUNCTURE: CPT | Performed by: PHYSICIAN ASSISTANT

## 2018-01-05 PROCEDURE — G0103 PSA SCREENING: HCPCS | Performed by: PHYSICIAN ASSISTANT

## 2018-01-05 PROCEDURE — 85025 COMPLETE CBC W/AUTO DIFF WBC: CPT | Performed by: PHYSICIAN ASSISTANT

## 2018-01-05 PROCEDURE — 80061 LIPID PANEL: CPT | Performed by: PHYSICIAN ASSISTANT

## 2018-01-05 PROCEDURE — 99396 PREV VISIT EST AGE 40-64: CPT | Performed by: PHYSICIAN ASSISTANT

## 2018-01-05 PROCEDURE — 71046 X-RAY EXAM CHEST 2 VIEWS: CPT | Mod: FY

## 2018-01-05 RX ORDER — ATORVASTATIN CALCIUM 20 MG/1
20 TABLET, FILM COATED ORAL DAILY
Qty: 90 TABLET | Refills: 1 | Status: SHIPPED | OUTPATIENT
Start: 2018-01-05 | End: 2018-06-01

## 2018-01-05 ASSESSMENT — PAIN SCALES - GENERAL: PAINLEVEL: NO PAIN (0)

## 2018-01-05 NOTE — NURSING NOTE
"Chief Complaint   Patient presents with     Physical     Panel Management     Tdap, lipi, Flu       Initial /86 (Cuff Size: Adult Large)  Pulse 72  Temp 97.7  F (36.5  C) (Temporal)  Resp 18  Ht 5' 6.75\" (1.695 m)  Wt 218 lb 6.4 oz (99.1 kg)  BMI 34.46 kg/m2 Estimated body mass index is 34.46 kg/(m^2) as calculated from the following:    Height as of this encounter: 5' 6.75\" (1.695 m).    Weight as of this encounter: 218 lb 6.4 oz (99.1 kg).  Medication Reconciliation: complete  "

## 2018-01-05 NOTE — MR AVS SNAPSHOT
After Visit Summary   1/5/2018    Alex Lynch    MRN: [id number]           Patient Information     Date Of Birth          1970        Visit Information        Provider Department      1/5/2018 10:20 AM Shin Morrow PA-C [facility]        Today's Diagnoses     Routine general medical examination at a health care facility    -  1    Hypertension goal BP (blood pressure) < 140/90        Tobacco use disorder        Hyperlipidemia LDL goal <130        Obesity, Class I, BMI 30-34.9        History of substance abuse        Asbestos exposure        Abnormal lung sounds          Care Instructions      Preventive Health Recommendations  Male Ages 40 to 49    Yearly exam:             See your health care provider every year in order to  o   Review health changes.   o   Discuss preventive care.    o   Review your medicines if your doctor has prescribed any.    You should be tested each year for STDs (sexually transmitted diseases) if you re at risk.     Have a cholesterol test every 5 years.     Have a colonoscopy (test for colon cancer) if someone in your family has had colon cancer or polyps before age 50.     After age 45, have a diabetes test (fasting glucose). If you are at risk for diabetes, you should have this test every 3 years.      Talk with your health care provider about whether or not a prostate cancer screening test (PSA) is right for you.    Shots: Get a flu shot each year. Get a tetanus shot every 10 years.     Nutrition:    Eat at least 5 servings of fruits and vegetables daily.     Eat whole-grain bread, whole-wheat pasta and brown rice instead of white grains and rice.     Talk to your provider about Calcium and Vitamin D.     Lifestyle    Exercise for at least 150 minutes a week (30 minutes a day, 5 days a week). This will help you control your weight and prevent disease.     Limit alcohol to one drink per day.     No smoking.     Wear sunscreen to prevent skin  cancer.     See your dentist every six months for an exam and cleaning.              Follow-ups after your visit        Additional Services     NUTRITION REFERRAL       Your provider has referred you to: [place]: [facility] [phone]   [email or web address]    Please be aware that coverage of these services is subject to the terms and limitations of your health insurance plan.  Call member services at your health plan with any benefit or coverage questions.      Please bring the following with you to your appointment:    (1) This referral request  (2) Any documents given to you regarding this referral  (3) Any specific questions you have about diet and/or food choices                  Future tests that were ordered for you today     Open Future Orders        Priority Expected Expires Ordered    General PFT Lab (Please always keep checked) Routine  1/5/2019 1/5/2018    Pulmonary Function Test Routine  1/5/2019 1/5/2018    XR Chest 2 Views Routine 1/5/2018 1/5/2019 1/5/2018            Who to contact     If you have questions or need follow up information about today's clinic visit or your schedule please contact [facility] directly at [phone].  Normal or non-critical lab and imaging results will be communicated to you by MyChart, letter or phone within 4 business days after the clinic has received the results. If you do not hear from us within 7 days, please contact the clinic through MyChart or phone. If you have a critical or abnormal lab result, we will notify you by phone as soon as possible.  Submit refill requests through LearnSomething or call your pharmacy and they will forward the refill request to us. Please allow 3 business days for your refill to be completed.          Additional Information About Your Visit        [facility] Information     LearnSomething lets you send messages to your doctor, view your test results, renew your prescriptions, schedule  "appointments and more. To sign up, go to [email or web address] . Click on \"Log in\" on the left side of the screen, which will take you to the Welcome page. Then click on \"Sign up Now\" on the right side of the page.     You will be asked to enter the access code listed below, as well as some personal information. Please follow the directions to create your username and password.     Your access code is: [email or web address]  Expires: 2018 10:49 AM     Your access code will  in 90 days. If you need help or a new code, please call your [facility] clinic or [phone].        Care EveryWhere ID     This is your Care EveryWhere ID. This could be used by other organizations to access your [facility] medical records  [id number]        Your Vitals Were     Pulse Temperature Respirations Height BMI (Body Mass Index)       72 97.7  F (36.5  C) (Temporal) 18 5' 6.75\" (1.695 m) 34.46 kg/m2        Blood Pressure from Last 3 Encounters:   18 136/82   17 126/82   17 128/70    Weight from Last 3 Encounters:   18 218 lb 6.4 oz (99.1 kg)   17 217 lb (98.4 kg)   17 218 lb (98.9 kg)              We Performed the Following     CBC with platelets and differential     Comprehensive metabolic panel (BMP + Alb, Alk Phos, ALT, AST, Total. Bili, TP)     Lipid panel reflex to direct LDL Fasting     NUTRITION REFERRAL     PSA, screen          Today's Medication Changes          These changes are accurate as of: 18 10:49 AM.  If you have any questions, ask your nurse or doctor.               Stop taking these medicines if you haven't already. Please contact your care team if you have questions.     cyclobenzaprine 10 MG tablet   Commonly known as:  FLEXERIL   Stopped by:  Shin Morrow PA-C           HYDROcodone-acetaminophen 5-325 MG per tablet   Commonly known as:  NORCO   Stopped by:  Shin Morrow PA-C                    Primary Care Provider Office Phone # Fax #    [facility] " [phone]       [address] [place] [id number]        Equal Access to Services     SELINA LÓPEZ : Hadii aad ku hadmaribelalice Gould, waverónicada shainajayshreeha, drakemiki vivasaugustda rebecajoycelynrachel, aydee alisin hayaashaji jean baptistedawood vaughnnick alphonso hatch. So [facility] [phone].    ATENCIÓN: Si habla español, tiene a bruce disposición servicios gratuitos de asistencia lingüística. Llame al [phone].    We comply with applicable federal civil rights laws and [place] laws. We do not discriminate on the basis of race, color, national origin, age, disability, sex, sexual orientation, or gender identity.            Thank you!     Thank you for choosing [facility]  for your care. Our goal is always to provide you with excellent care. Hearing back from our patients is one way we can continue to improve our services. Please take a few minutes to complete the written survey that you may receive in the mail after your visit with us. Thank you!             Your Updated Medication List - Protect others around you: Learn how to safely use, store and throw away your medicines at www.disposemymeds.org.      Notice  As of 1/5/2018 10:49 AM    You have not been prescribed any medications.

## 2018-01-19 ENCOUNTER — OFFICE VISIT (OUTPATIENT)
Dept: FAMILY MEDICINE | Facility: CLINIC | Age: 48
End: 2018-01-19
Payer: OTHER MISCELLANEOUS

## 2018-01-19 VITALS
TEMPERATURE: 97.5 F | OXYGEN SATURATION: 100 % | DIASTOLIC BLOOD PRESSURE: 100 MMHG | HEART RATE: 93 BPM | SYSTOLIC BLOOD PRESSURE: 148 MMHG | WEIGHT: 222.2 LBS | BODY MASS INDEX: 35.06 KG/M2

## 2018-01-19 DIAGNOSIS — M53.3 SACROILIAC JOINT PAIN: Primary | ICD-10-CM

## 2018-01-19 PROCEDURE — 99213 OFFICE O/P EST LOW 20 MIN: CPT | Performed by: FAMILY MEDICINE

## 2018-01-19 ASSESSMENT — PAIN SCALES - GENERAL: PAINLEVEL: MODERATE PAIN (5)

## 2018-01-19 NOTE — MR AVS SNAPSHOT
"              After Visit Summary   2018    Alex Lynch    MRN: [id number]           Patient Information     Date Of Birth          1970        Visit Information        Provider Department      2018 9:20 AM Alexandro Moore MD [facility]        Today's Diagnoses     Sacroiliac joint pain    -  1       Follow-ups after your visit        Who to contact     If you have questions or need follow up information about today's clinic visit or your schedule please contact [facility] directly at [phone].  Normal or non-critical lab and imaging results will be communicated to you by [employer], letter or phone within 4 business days after the clinic has received the results. If you do not hear from us within 7 days, please contact the clinic through [employer] or phone. If you have a critical or abnormal lab result, we will notify you by phone as soon as possible.  Submit refill requests through [employer] or call your pharmacy and they will forward the refill request to us. Please allow 3 business days for your refill to be completed.          Additional Information About Your Visit        MyChart Information     [employer] lets you send messages to your doctor, view your test results, renew your prescriptions, schedule appointments and more. To sign up, go to [email or web address] . Click on \"Log in\" on the left side of the screen, which will take you to the Welcome page. Then click on \"Sign up Now\" on the right side of the page.     You will be asked to enter the access code listed below, as well as some personal information. Please follow the directions to create your username and password.     Your access code is: [email or web address]  Expires: 2018 10:49 AM     Your access code will  in 90 days. If you need help or a new code, please call your [facility] or [phone].        Care EveryWhere ID     This is your Care EveryWhere ID. This could be used by " other organizations to access your [facility] [place] medical records  [id number]        Your Vitals Were     Pulse Temperature Pulse Oximetry BMI (Body Mass Index)          93 97.5  F (36.4  C) (Temporal) 100% 35.06 kg/m2         Blood Pressure from Last 3 Encounters:   01/19/18 (!) 148/100   01/05/18 136/82   08/04/17 126/82    Weight from Last 3 Encounters:   01/19/18 222 lb 3.2 oz (100.8 kg)   01/05/18 218 lb 6.4 oz (99.1 kg)   08/04/17 217 lb (98.4 kg)              Today, you had the following     No orders found for display       Primary Care Provider Office Phone # Fax #    [facility] [phone] [address] [facility] [address]        Equal Access to Services     SELINA LÓPEZ : Alexandria lua Soalen, waaxda luqadaha, qaybta kaalmada radha, aydee werner . So [facility] [place] [phone].    ATENCIÓN: Si habla español, tiene a bruce disposición servicios gratuitos de asistencia lingüística. [facility] [phone].    We comply with applicable federal civil rights laws and [place] laws. We do not discriminate on the basis of race, color, national origin, age, disability, sex, sexual orientation, or gender identity.            Thank you!     Thank you for choosing [facility]  for your care. Our goal is always to provide you with excellent care. Hearing back from our patients is one way we can continue to improve our services. Please take a few minutes to complete the written survey that you may receive in the mail after your visit with us. Thank you!             Your Updated Medication List - Protect others around you: Learn how to safely use, store and throw away your medicines at www.disposemymeds.org.          This list is accurate as of: 1/19/18 12:06 PM.  Always use your most recent med list.                   Brand Name Dispense Instructions for use Diagnosis    atorvastatin 20 MG tablet    LIPITOR    90 tablet    Take 1 tablet  (20 mg) by mouth daily    Hyperlipidemia LDL goal <130

## 2018-01-19 NOTE — NURSING NOTE
"Chief Complaint   Patient presents with     Hip Pain     states that he has a dislocated pelvis.  States that his normal doctor is on vacation.  States that he needs a letter for work.       Initial BP (!) 148/100 (BP Location: Right arm, Patient Position: Chair, Cuff Size: Adult Regular)  Pulse 93  Temp 97.5  F (36.4  C) (Temporal)  Wt 222 lb 3.2 oz (100.8 kg)  SpO2 100%  BMI 35.06 kg/m2 Estimated body mass index is 35.06 kg/(m^2) as calculated from the following:    Height as of 1/5/18: 5' 6.75\" (1.695 m).    Weight as of this encounter: 222 lb 3.2 oz (100.8 kg).  Medication Reconciliation: complete   Senait Vaughan CMA    Health Maintenance Due   Topic Date Due     TETANUS IMMUNIZATION (SYSTEM ASSIGNED)  05/20/1986     INFLUENZA VACCINE (SYSTEM ASSIGNED)  09/01/2017       Health Maintenance reviewed at today's visit patient asked to schedule/complete:   Patient is aware.       "

## 2018-01-19 NOTE — LETTER
[address] [address]  [phone]        January 19, 2018    Regarding:  Alex MOFFETT Syed  [id number] [facility] [address]              To Whom It May Concern;  Please excuse on 1/17, 1/8.   May return tonight with restriction. Light duty, lifting < 5 lbs, minimal twisting, bending at the waist.           Sincerely,        Alexandro Moore MD

## 2018-01-19 NOTE — PROGRESS NOTES
SUBJECTIVE:                                                    Alex Lynch is a 47 year old male who presents to clinic today for the following health issues:    Chief Complaint   Patient presents with     Work Comp     states that he has a dislocated pelvis.  States that his normal doctor is on vacation.  States that he needs a letter for work.        Patient comes in today requesting a letter for work.  He has a history of SI dysfunction.  He sees a physician who gives him osteopathic manipulation with helps considerably.  2-3 days ago he had increased pain in his left buttock.  He was not able to go to work.      ROS:  Constitutional, HEENT, cardiovascular, pulmonary, gi and gu systems are negative, except as otherwise noted.      OBJECTIVE:                                                    BP (!) 148/100 (BP Location: Right arm, Patient Position: Chair, Cuff Size: Adult Regular)  Pulse 93  Temp 97.5  F (36.4  C) (Temporal)  Wt 222 lb 3.2 oz (100.8 kg)  SpO2 100%  BMI 35.06 kg/m2  Body mass index is 35.06 kg/(m^2).    Well-appearing and in no distress. Heart is regular. Lungs are clear and unlabored. spine is in good aligment grossly. No spasms appreciated. There is mild tenderness diffusely in the left buttock area.  SI nontender. Negative Curt test. Negative straight leg raise. Normal symmetric patellar reflexes. No ankle clonus. Normal strength and sensation to light touch testing in lower extremities. gait in normal appearance, toe walk normal         ASSESSMENT/PLAN:                                                        ICD-10-CM    1. Sacroiliac joint pain M53.3        He was given a letter to return light duty.  He will do this until his physician can manipulate the area and hopefully improve his symptoms.  I was able to see his documentation of that in the record.  Return to clinic anytime    Alexandro Moore MD  [facility]

## 2018-01-19 NOTE — LETTER
[address] [address]  [phone]        January 19, 2018    Regarding:  Alex ZUHAIR Lynch  [address] [facility] [address]              To Whom It May Concern;  May return to work light duty, lifting restriction 5 lbs, minimal twisting and bending at the waist.      Sincerely,    Alexandro Moore MD

## 2018-01-29 ENCOUNTER — TRANSFERRED RECORDS (OUTPATIENT)
Dept: HEALTH INFORMATION MANAGEMENT | Facility: CLINIC | Age: 48
End: 2018-01-29

## 2018-05-31 NOTE — PROGRESS NOTES
SUBJECTIVE:   Alex Lynch is a 48 year old male who presents to clinic today for the following health issues:    HPI     FORM for work, bioscreening     Hyperlipidemia Follow-Up      Rate your low fat/cholesterol diet?: fair    Taking statin?  Yes, no muscle aches from statin    Other lipid medications/supplements?:  None  Has been doing well although he has missed multiple days of his statin medication he is fasting today so we will recheck his cholesterol panel.  This was a recommendation from his last office visit.    LDL Cholesterol Calculated (mg/dL)   Date Value   01/05/2018 196 (H)     Problem list and histories reviewed & adjusted, as indicated.  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 No pertinent family history.      Current Outpatient Prescriptions   Medication Sig Dispense Refill     atorvastatin (LIPITOR) 20 MG tablet Take 1 tablet (20 mg) by mouth daily 90 tablet 1     [DISCONTINUED] atorvastatin (LIPITOR) 20 MG tablet Take 1 tablet (20 mg) by mouth daily 90 tablet 1     No Known Allergies  BP Readings from Last 3 Encounters:   06/01/18 130/78   01/19/18 (!) 148/100   01/05/18 136/82    Wt Readings from Last 3 Encounters:   06/01/18 220 lb (99.8 kg)   01/19/18 222 lb 3.2 oz (100.8 kg)   01/05/18 218 lb 6.4 oz (99.1 kg)                  Labs  "reviewed in EPIC    ROS:  Constitutional, HEENT, cardiovascular, pulmonary, gi and gu systems are negative, except as otherwise noted.    OBJECTIVE:     /78 (BP Location: Right arm, Patient Position: Chair, Cuff Size: Adult Regular)  Pulse 84  Temp 98.4  F (36.9  C) (Oral)  Resp 16  Ht 5' 6.75\" (1.695 m)  Wt 220 lb (99.8 kg)  BMI 34.72 kg/m2  Body mass index is 34.72 kg/(m^2).  GENERAL: healthy, alert and no distress  NECK: no adenopathy, no asymmetry, masses, or scars and thyroid normal to palpation  RESP: lungs clear to auscultation - no rales, rhonchi or wheezes  CV: regular rate and rhythm, normal S1 S2, no S3 or S4, no murmur, click or rub, no peripheral edema and peripheral pulses strong        ASSESSMENT/PLAN:     1. hyperlipidemia  Refill given for Lipitor he denies side effects related to this medication. Will recheck lipid panel as well. Forms completed for biometrics.   - atorvastatin (LIPITOR) 20 MG tablet; Take 1 tablet (20 mg) by mouth daily  Dispense: 90 tablet; Refill: 1  - Lipid Profile (Chol, Trig, HDL, LDL calc)    Will call with lab results and any further treatment as indicated    RAIZA Del Valle [facility]  Answers for HPI/ROS submitted by the patient on 6/1/2018   PHQ-2 Score: 0    "

## 2018-06-01 ENCOUNTER — OFFICE VISIT (OUTPATIENT)
Dept: FAMILY MEDICINE | Facility: OTHER | Age: 48
End: 2018-06-01
Payer: COMMERCIAL

## 2018-06-01 ENCOUNTER — TELEPHONE (OUTPATIENT)
Dept: FAMILY MEDICINE | Facility: OTHER | Age: 48
End: 2018-06-01

## 2018-06-01 VITALS
TEMPERATURE: 98.4 F | RESPIRATION RATE: 16 BRPM | WEIGHT: 220 LBS | BODY MASS INDEX: 34.53 KG/M2 | DIASTOLIC BLOOD PRESSURE: 78 MMHG | SYSTOLIC BLOOD PRESSURE: 130 MMHG | HEART RATE: 84 BPM | HEIGHT: 67 IN

## 2018-06-01 DIAGNOSIS — E78.5 HYPERLIPIDEMIA LDL GOAL <130: ICD-10-CM

## 2018-06-01 PROBLEM — M42.00 JUVENILE OSTEOCHONDROSIS OF SPINE: Status: ACTIVE | Noted: 2018-06-01

## 2018-06-01 LAB
CHOLEST SERPL-MCNC: 244 MG/DL
HDLC SERPL-MCNC: 54 MG/DL
LDLC SERPL CALC-MCNC: 167 MG/DL
NONHDLC SERPL-MCNC: 190 MG/DL
TRIGL SERPL-MCNC: 113 MG/DL

## 2018-06-01 PROCEDURE — 36415 COLL VENOUS BLD VENIPUNCTURE: CPT | Performed by: NURSE PRACTITIONER

## 2018-06-01 PROCEDURE — 80061 LIPID PANEL: CPT | Performed by: NURSE PRACTITIONER

## 2018-06-01 PROCEDURE — 99213 OFFICE O/P EST LOW 20 MIN: CPT | Performed by: NURSE PRACTITIONER

## 2018-06-01 RX ORDER — ATORVASTATIN CALCIUM 20 MG/1
20 TABLET, FILM COATED ORAL DAILY
Qty: 90 TABLET | Refills: 1 | Status: SHIPPED | OUTPATIENT
Start: 2018-06-01

## 2018-06-01 NOTE — PROGRESS NOTES
Please advise Alex Lynch,  1970, that his lab results were cholesterol panel improving please continue current dose of lipitor and continue to watch diet and exercise.  [phone] (home)   Thank you  Charlette Hager CNP

## 2018-06-01 NOTE — TELEPHONE ENCOUNTER
----- Message from RAIZA Kate CNP sent at 2018  2:17 PM CDT -----  Please advise Alex Lynch,  1970, that his lab results were cholesterol panel improving please continue current dose of lipitor and continue to watch diet and exercise.  [phone] (home)   Thank you  Charlette Hager CNP

## 2018-06-01 NOTE — MR AVS SNAPSHOT
"              After Visit Summary   6/1/2018    Alex Lynch    MRN: [id number]           Patient Information     Date Of Birth          1970        Visit Information        Provider Department      6/1/2018 12:00 PM Charlette Hager APRN CNP [facility]        Today's Diagnoses     hyperlipidemia           Follow-ups after your visit        Who to contact     If you have questions or need follow up information about today's clinic visit or your schedule please contact [facility] directly at [phone].  Normal or non-critical lab and imaging results will be communicated to you by MyChart, letter or phone within 4 business days after the clinic has received the results. If you do not hear from us within 7 days, please contact the clinic through MyChart or phone. If you have a critical or abnormal lab result, we will notify you by phone as soon as possible.  Submit refill requests through [employer] or call your pharmacy and they will forward the refill request to us. Please allow 3 business days for your refill to be completed.          Additional Information About Your Visit        Care EveryWhere ID     This is your Care EveryWhere ID. This could be used by other organizations to access your [facility] medical records  [id number]        Your Vitals Were     Pulse Temperature Respirations Height BMI (Body Mass Index)       84 98.4  F (36.9  C) (Oral) 16 5' 6.75\" (1.695 m) 34.72 kg/m2        Blood Pressure from Last 3 Encounters:   06/01/18 130/78   01/19/18 (!) 148/100   01/05/18 136/82    Weight from Last 3 Encounters:   06/01/18 220 lb (99.8 kg)   01/19/18 222 lb 3.2 oz (100.8 kg)   01/05/18 218 lb 6.4 oz (99.1 kg)              We Performed the Following     Lipid Profile (Chol, Trig, HDL, LDL calc)          Where to get your medicines      These medications were sent to [facility] [place], [place] - [address]   [address] [place] " [place] [address]     Phone:  [phone]     atorvastatin 20 MG tablet          Primary Care Provider Office Phone # Fax #    [facility] [phone] [address] [facility] [address]        Equal Access to Services     SAKINALOR MARIBEL AH: Hadii aad ku hadasho Soomaali, waaxda luqadaha, qaybta kaalmada adeegyada, waxay idiin hayaan adeeg khnick lapaco hatch. So [facility] [phone].    ATENCIÓN: Si habla español, tiene a bruce disposición servicios gratuitos de asistencia lingüística. Llame al [phone].    We comply with applicable federal civil rights laws and [place] laws. We do not discriminate on the basis of race, color, national origin, age, disability, sex, sexual orientation, or gender identity.            Thank you!     Thank you for choosing [facility]  for your care. Our goal is always to provide you with excellent care. Hearing back from our patients is one way we can continue to improve our services. Please take a few minutes to complete the written survey that you may receive in the mail after your visit with us. Thank you!             Your Updated Medication List - Protect others around you: Learn how to safely use, store and throw away your medicines at www.disposemymeds.org.          This list is accurate as of 6/1/18  1:07 PM.  Always use your most recent med list.                   Brand Name Dispense Instructions for use Diagnosis    atorvastatin 20 MG tablet    LIPITOR    90 tablet    Take 1 tablet (20 mg) by mouth daily    Hyperlipidemia LDL goal <130

## 2018-06-01 NOTE — LETTER
[facility]  [address]  [place] [address] [phone]          June 5, 2018    Alex Lynch                                                                                                                     [id number] [facility] [address]            Dear Alex,    We have made multiple attempts to contact you and have been unsuccessful.    Your cholesterol panel is improving. Please continue current dose of Lipitor and continue to watch your diet and exercise.     Results for orders placed or performed in visit on 06/01/18   Lipid Profile (Chol, Trig, HDL, LDL calc)   Result Value Ref Range    Cholesterol 244 (H) <200 mg/dL    Triglycerides 113 <150 mg/dL    HDL Cholesterol 54 >39 mg/dL    LDL Cholesterol Calculated 167 (H) <100 mg/dL    Non HDL Cholesterol 190 (H) <130 mg/dL         Sincerely,         Charlette Taylor Hager CNP

## 2018-06-11 ENCOUNTER — HOSPITAL ENCOUNTER (EMERGENCY)
Facility: CLINIC | Age: 48
Discharge: HOME | End: 2018-06-11
Attending: PHYSICIAN ASSISTANT | Admitting: PHYSICIAN ASSISTANT
Payer: COMMERCIAL

## 2018-06-11 VITALS
DIASTOLIC BLOOD PRESSURE: 96 MMHG | OXYGEN SATURATION: 96 % | RESPIRATION RATE: 18 BRPM | SYSTOLIC BLOOD PRESSURE: 160 MMHG | WEIGHT: 210 LBS | BODY MASS INDEX: 33.14 KG/M2 | HEART RATE: 79 BPM | TEMPERATURE: 97.6 F

## 2018-06-11 DIAGNOSIS — R10.11 RUQ ABDOMINAL PAIN: ICD-10-CM

## 2018-06-11 PROCEDURE — 99282 EMERGENCY DEPT VISIT SF MDM: CPT | Performed by: PHYSICIAN ASSISTANT

## 2018-06-11 PROCEDURE — 99284 EMERGENCY DEPT VISIT MOD MDM: CPT | Mod: Z6 | Performed by: PHYSICIAN ASSISTANT

## 2018-06-11 RX ORDER — OXYCODONE HYDROCHLORIDE 5 MG/1
5 TABLET ORAL EVERY 6 HOURS PRN
Qty: 10 TABLET | Refills: 0 | Status: SHIPPED | OUTPATIENT
Start: 2018-06-11 | End: 2018-06-19

## 2018-06-11 NOTE — ED AVS SNAPSHOT
[facility]    [address] [place] [address]    Phone:  [phone]    Fax:  [phone]                                       Alex Lynch   MRN: [id number]    Department:  [facility]   Date of Visit:  6/11/2018           Patient Information     Date Of Birth          1970        Your diagnoses for this visit were:     RUQ abdominal pain        You were seen by Fabiano Lagunas PA-C.      Follow-up Information     Follow up with [facility].    Specialty:  EMERGENCY MEDICINE    Why:  As needed, If symptoms worsen    Contact information:    [address] [place] [address] [phone]    Additional information:    From [address]: Exit at [address] on south side of [place]. Turn right on [address]. Turn left at stoplight on [address]. [facility] will be in view two blocks ahead        Discharge Instructions       It was a pleasure working with you today!  I hope your condition improves rapidly!     Please consider staying away from [employer] in the future! :)    Please return for repeat evaluation if you are worsening or if you develop a fever.         24 Hour Appointment Hotline       To make an appointment at any [facility] clinic, call [phone] ([phone]). If you don't have a family doctor or clinic, we will help you find one. [facility] clinics are conveniently located to serve the needs of you and your family.             Review of your medicines      START taking        Dose / Directions Last dose taken    oxyCODONE IR 5 MG tablet   Commonly known as:  ROXICODONE   Dose:  5 mg   Quantity:  10 tablet        Take 1 tablet (5 mg) by mouth every 6 hours as needed for pain   Refills:  0          Our records show that you are taking the medicines listed below. If these are incorrect, please call your family doctor or clinic.        Dose /  Directions Last dose taken    atorvastatin 20 MG tablet   Commonly known as:  LIPITOR   Dose:  20 mg   Quantity:  90 tablet        Take 1 tablet (20 mg) by mouth daily   Refills:  1                Information about OPIOIDS     PRESCRIPTION OPIOIDS: WHAT YOU NEED TO KNOW   You have a prescription for an opioid (narcotic) pain medicine. Opioids can cause addiction. If you have a history of chemical dependency of any type, you are at a higher risk of becoming addicted to opioids. Only take this medicine after all other options have been tried. Take it for as short a time and as few doses as possible.     Do not:    Drive. If you drive while taking these medicines, you could be arrested for driving under the influence (DUI).    Operate heavy machinery    Do any other dangerous activities while taking these medicines.     Drink any alcohol while taking these medicines.      Take with any other medicines that contain acetaminophen. Read all labels carefully. Look for the word  acetaminophen  or  Tylenol.  Ask your pharmacist if you have questions or are unsure.    Store your pills in a secure place, locked if possible. We will not replace any lost or stolen medicine. If you don t finish your medicine, please throw away (dispose) as directed by your pharmacist. The [place] [employer]ution [employer] has more information about safe disposal: https://www.pca.[facility].[place].us/living-green/managing-unwanted-medications    All opioids tend to cause constipation. Drink plenty of water and eat foods that have a lot of fiber, such as fruits, vegetables, prune juice, apple juice and high-fiber cereal. Take a laxative (Miralax, milk of magnesia, Colace, Senna) if you don t move your bowels at least every other day.         Prescriptions were sent or printed at these locations (1 Prescription)                   [facility], [place] - [address]    [address] [facility] [address]    Telephone:  [phone]    Fax:  [phone]   Hours:                  Printed at Department/Unit printer (1 of 1)         oxyCODONE IR (ROXICODONE) 5 MG tablet                Orders Needing Specimen Collection     None      Pending Results     No orders found from 6/9/2018 to 6/12/2018.            Pending Culture Results     No orders found from 6/9/2018 to 6/12/2018.            Pending Results Instructions     If you had any lab results that were not finalized at the time of your Discharge, you can call the ED Lab Result RN at [phone]. You will be contacted by this team for any positive Lab results or changes in treatment. The nurses are available 7 days a week from 10A to 6:30P.  You can leave a message 24 hours per day and they will return your call.        Thank you for choosing [facility]       Thank you for choosing [facility] for your care. Our goal is always to provide you with excellent care. Hearing back from our patients is one way we can continue to improve our services. Please take a few minutes to complete the written survey that you may receive in the mail after you visit with us. Thank you!        Care EveryWhere ID     This is your Care EveryWhere ID. This could be used by other organizations to access your [facility] medical records  [id number]        Equal Access to Services     SELINA LÓPEZ AH: Alexandria Gould, rosie hernández, hernán garcia, aydee hatch. So [facility] [phone].    ATENCIÓN: Si habla español, tiene a bruce disposición servicios gratuitos de asistencia lingüística. Llame al [phone].    We comply with applicable federal civil rights laws and [place] laws. We do not discriminate on the basis of race, color, national origin, age, disability, sex, sexual orientation, or gender identity.            After Visit Summary       This is your record. Keep this with you and show to your community pharmacist(s) and doctor(s) at your next visit.

## 2018-06-11 NOTE — ED TRIAGE NOTES
Pt c/o ABD pain.  States he had a cheese brat and started having duodenum issues.  Has a hx of this.

## 2018-06-11 NOTE — ED AVS SNAPSHOT
[facility] Emergency Department    [address] [place] [address]    Phone:  [phone]    Fax:  [phone]                                       Alex Lynch   MRN: [id number]    Department:  [facility] Emergency Department   Date of Visit:  6/11/2018           After Visit Summary Signature Page     I have received my discharge instructions, and my questions have been answered. I have discussed any challenges I see with this plan with the nurse or doctor.    ..........................................................................................................................................  Patient/Patient Representative Signature      ..........................................................................................................................................  Patient Representative Print Name and Relationship to Patient    ..................................................               ................................................  Date                                            Time    ..........................................................................................................................................  Reviewed by Signature/Title    ...................................................              ..............................................  Date                                                            Time

## 2018-06-11 NOTE — ED NOTES
"Pt states \"my duodenum hurts.\"  Pt at [facility] [employer] this am with some chips and has had RUQ pain that has been getting worse every since.  He has a \"hx of duodenum problems\" and was told that he needed surgery but never has had it.   "

## 2018-06-12 NOTE — DISCHARGE INSTRUCTIONS
It was a pleasure working with you today!  I hope your condition improves rapidly!     Please consider staying away from [facility] in the future! :)    Please return for repeat evaluation if you are worsening or if you develop a fever.

## 2018-06-12 NOTE — ED PROVIDER NOTES
History     Chief Complaint   Patient presents with     Abdominal Pain     HPI  Alex Lynch is a 48 year old male who presents for evaluation of right upper quadrant pain. This is a chronic issue that he has had off and on for the past 20 years. He typically gets these events about 1-2 times per year. He has not identified any particular trigger. Today his symptoms were preceded by eating 3 but her chart are brought's for lunch. He also had potato chips with this. One hour after eating he developed right upper quadrant abdominal discomfort. This pain will typically last about 2-3 days per his report. If he drinks clear fluids and does not eat any fatty foods for the next 2 days, his symptoms will generally resolved. He denies any nausea, vomiting, fever, chills, diarrhea, hematochezia. Pain is dull in nature and rated 8.5 on a scale of 10. Tylenol and ibuprofen do not help. He has underwent full GI workup with right upper quadrant ultrasound, HIDA scan, CT scanning, and laboratory testing on multiple occasions with no etiology. It is thought that he has episodes of duodenitis. His symptoms have always resolved with time. He has not required any further intervention. He states that he generally needs help with a few pain pills to get him through the initial pain. He is hoping that we can do that for him today without any further workup.    Problem List:    Patient Active Problem List    Diagnosis Date Noted     Juvenile osteochondrosis of spine 06/01/2018     Priority: Medium     Hyperlipidemia LDL goal <130 01/05/2018     Priority: Medium     Hypertension goal BP (blood pressure) < 140/90 01/05/2018     Priority: Medium     Obesity, Class I, BMI 30-34.9 01/05/2018     Priority: Medium     History of substance abuse 01/05/2018     Priority: Medium     Methamphetamine, cocaine, marijuana.       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    Social History:  Marital Status:   [4]  Social History   Substance Use Topics     Smoking status: Current Every Day Smoker     Packs/day: 1.00     Years: 30.00     Types: Cigarettes     Smokeless tobacco: Never Used     Alcohol use Yes      Comment: rarely        Medications:      atorvastatin (LIPITOR) 20 MG tablet   oxyCODONE IR (ROXICODONE) 5 MG tablet         Review of Systems   All other systems reviewed and are negative.      Physical Exam   BP: (!) 160/96  Pulse: 79  Temp: 97.6  F (36.4  C)  Resp: 18  Weight: 95.3 kg (210 lb)  SpO2: 96 %      Physical Exam   Nursing note and vitals reviewed.  Generally healthy appearing male in NAD who is active and non-toxic appearing.   Skin:  No rashes or lesions are noted on inspection of the torso, face, and upper extremities.   Head: Normocephalic, atraumatic, nontender to palpation  Eyes: PERRLA, conjunctiva and sclera clear  Ears: Bilateral TM's and canals are clear.  TM's translucent without erythema or effusion.  Nose: Nares normal and patent bilaterally.  Mucous membranes are non-erythematous and non-edematous.  No sinus tenderness.  Throat: Mucous membranes are clear.  No tonsilar hypertrophy, exudate, or erythema.  Neck: Supple.  FROM without pain.  No adenopathy.  No thyromegaly.   Heart:  RRR with normal S1 and S2.  No S3 or S4.  No murmur, rub, gallop, or click.  PMI is nondisplaced.   Lungs:  CTA bilaterally without wheezes, rales, or rhonchi.  Good breath sounds heard throughout all lung fields.  Tympanitic to percussion with no areas of dullness.   Abdomen: Normal bowel sounds. No masses. No hernia.\" Palpation in " the right upper quadrant. Negative Gonzalez sign. No hepatosplenomegaly. Remainder of the abdomen nontender to palpation.    ED Course     ED Course     Procedures               Critical Care time:  none               No results found for this or any previous visit (from the past 24 hour(s)).    Medications - No data to display    Assessments & Plan (with Medical Decision Making)  RUQ abdominal pain     48 year old male presents for recurrent chronic right upper quadrant abdominal pain occurring one to 2 times per year lasting 2-3 days per episode. Thought to be related to duodenitis in the past. Symptoms preceded by a high fat meal intake with symptoms starting 1 hour after. He has had a negative workup for cholecystitis/cholelithiasis in the past. Denies any other symptoms. States that it but he generally drinks clear fluids for 1.5-2 days, and avoids significant food intake, that his symptoms do generally resolved. He is hoping that we could help him with pain medication for the acute pain currently. On exam blood pressure 160/96, pulse 79, temperature 97.6, and oxygen saturation 96% on room air. Patient has tenderness in the right upper quadrant with a negative Gonzalez sign and no rebound or guarding. This appears to be in exacerbation of his chronic recurrent condition. I offered right upper quadrant ultrasound, and labs for further evaluation. He declined. He was hoping just to get pain medication to get him through the next 12-24 hours. He states that once he gets through the initial period, that he generally does okay.  No concerning refill patterns. [place]  referenced as well. I think this is appropriate given that he has stable vital signs and this is a chronically recurring condition. Patient appears reliable, and states that he would return for repeat evaluation if symptoms were to worsen or change. I did give him #10 tabs of oxycodone to use as needed for break through pain. Possible side effects of  medication discussed. No driving for 8 hours after taking the medication. Patient promised to return if his symptoms change or do not improve. He is in agreement with this plan and was suitable for discharge.      I have reviewed the nursing notes.    I have reviewed the findings, diagnosis, plan and need for follow up with the patient.       Discharge Medication List as of 6/11/2018  7:32 PM      START taking these medications    Details   oxyCODONE IR (ROXICODONE) 5 MG tablet Take 1 tablet (5 mg) by mouth every 6 hours as needed for pain, Disp-10 tablet, R-0, Local Print             Final diagnoses:   RUQ abdominal pain       Disclaimer: This note consists of symbols derived from keyboarding, dictation and/or voice recognition software. As a result, there may be errors in the script that have gone undetected. Please consider this when interpreting information found in this chart.    6/11/2018   Fabiano Lagunas PA-C   [facility] EMERGENCY DEPARTMENT     Fabiano Lagunas PA-C  06/11/18 0484

## 2018-06-15 ENCOUNTER — TELEPHONE (OUTPATIENT)
Dept: FAMILY MEDICINE | Facility: OTHER | Age: 48
End: 2018-06-15

## 2018-06-15 NOTE — LETTER
[facility]  [address], [place], [facility]  [place] [address] [phone]      June 19, 2018    Alex Lynch                                                                                                                     [id number] [facility] [address]            Dear Alex,    We have made multiple attempts to contact you and have been unsuccessful.    We received a request to order a CT scan, however, before a CT scan can be ordered, we would like to see you in clinic for assessment before an order can be placed.    Please call us at [phone] to schedule your appointment.      Sincerely,         [facility]

## 2018-06-15 NOTE — TELEPHONE ENCOUNTER
Reason for Call: Request for an order or referral:    Order or referral being requested: CT scan. [place]    Date needed: as soon as possible    Has the patient been seen by the PCP for this problem? YES    Additional comments: was seen in ER Monday for stomach issues. Still having sx with diarrhea. Told to have a CT scan if it continues.    Phone number Patient can be reached at:  Home number on file [phone] (home)    Best Time:  any    Can we leave a detailed message on this number?  YES    Call taken on 6/15/2018 at 8:05 AM by Rina Dunlap

## 2018-06-15 NOTE — TELEPHONE ENCOUNTER
He will need ov for assessment before I would place referral or orders for further studies.     Please help schedule   Thank you  Charlette Hager CNP

## 2018-06-19 ENCOUNTER — TELEPHONE (OUTPATIENT)
Dept: FAMILY MEDICINE | Facility: CLINIC | Age: 48
End: 2018-06-19

## 2018-06-19 ENCOUNTER — OFFICE VISIT (OUTPATIENT)
Dept: FAMILY MEDICINE | Facility: CLINIC | Age: 48
End: 2018-06-19
Payer: COMMERCIAL

## 2018-06-19 VITALS
WEIGHT: 217 LBS | DIASTOLIC BLOOD PRESSURE: 80 MMHG | SYSTOLIC BLOOD PRESSURE: 132 MMHG | HEART RATE: 66 BPM | TEMPERATURE: 98.2 F | BODY MASS INDEX: 34.24 KG/M2 | RESPIRATION RATE: 16 BRPM

## 2018-06-19 DIAGNOSIS — Z12.11 SPECIAL SCREENING FOR MALIGNANT NEOPLASMS, COLON: ICD-10-CM

## 2018-06-19 DIAGNOSIS — H10.13 ALLERGIC CONJUNCTIVITIS, BILATERAL: ICD-10-CM

## 2018-06-19 DIAGNOSIS — R10.11 RUQ ABDOMINAL PAIN: ICD-10-CM

## 2018-06-19 DIAGNOSIS — K29.00 OTHER ACUTE GASTRITIS WITHOUT HEMORRHAGE: ICD-10-CM

## 2018-06-19 DIAGNOSIS — Z09 HOSPITAL DISCHARGE FOLLOW-UP: ICD-10-CM

## 2018-06-19 LAB
ALBUMIN SERPL-MCNC: 3.8 G/DL (ref 3.4–5)
ALP SERPL-CCNC: 77 U/L (ref 40–150)
ALT SERPL W P-5'-P-CCNC: 37 U/L (ref 0–70)
AST SERPL W P-5'-P-CCNC: 18 U/L (ref 0–45)
BILIRUB DIRECT SERPL-MCNC: <0.1 MG/DL (ref 0–0.2)
BILIRUB SERPL-MCNC: 0.3 MG/DL (ref 0.2–1.3)
CRP SERPL-MCNC: <2.9 MG/L (ref 0–8)
ERYTHROCYTE [SEDIMENTATION RATE] IN BLOOD BY WESTERGREN METHOD: 5 MM/H (ref 0–20)
GGT SERPL-CCNC: 22 U/L (ref 0–75)
HGB BLD-MCNC: 15 G/DL (ref 13.3–17.7)
PROT SERPL-MCNC: 7.1 G/DL (ref 6.8–8.8)

## 2018-06-19 PROCEDURE — 99214 OFFICE O/P EST MOD 30 MIN: CPT | Performed by: FAMILY MEDICINE

## 2018-06-19 PROCEDURE — 86140 C-REACTIVE PROTEIN: CPT | Performed by: FAMILY MEDICINE

## 2018-06-19 PROCEDURE — 82977 ASSAY OF GGT: CPT | Performed by: FAMILY MEDICINE

## 2018-06-19 PROCEDURE — 85018 HEMOGLOBIN: CPT | Performed by: FAMILY MEDICINE

## 2018-06-19 PROCEDURE — 85652 RBC SED RATE AUTOMATED: CPT | Performed by: FAMILY MEDICINE

## 2018-06-19 PROCEDURE — 80076 HEPATIC FUNCTION PANEL: CPT | Performed by: FAMILY MEDICINE

## 2018-06-19 PROCEDURE — 36415 COLL VENOUS BLD VENIPUNCTURE: CPT | Performed by: FAMILY MEDICINE

## 2018-06-19 RX ORDER — OLOPATADINE HYDROCHLORIDE 1 MG/ML
1 SOLUTION/ DROPS OPHTHALMIC 2 TIMES DAILY
Qty: 5 ML | Refills: 3 | Status: SHIPPED | OUTPATIENT
Start: 2018-06-19 | End: 2018-07-23

## 2018-06-19 ASSESSMENT — PAIN SCALES - GENERAL: PAINLEVEL: NO PAIN (0)

## 2018-06-19 NOTE — MR AVS SNAPSHOT
After Visit Summary   6/19/2018    Alex Lynch    MRN: [id number]           Patient Information     Date Of Birth          1970        Visit Information        Provider Department      6/19/2018 11:10 AM Jessee Royal MD [facility]        Today's Diagnoses     Hospital discharge follow-up        RUQ abdominal pain        Other acute gastritis without hemorrhage        Special screening for malignant neoplasms, colon        Allergic conjunctivitis, bilateral           Follow-ups after your visit        Additional Services     GASTROENTEROLOGY ADULT REF PROCEDURE ONLY [facility] [phone]; No Provider Preference       Last Lab Result: Creatinine (mg/dL)       Date                     Value                 01/05/2018               1.01             ----------  Body mass index is 34.24 kg/(m^2).     Needed:  No  Language:  English    Patient will be contacted to schedule procedure.     Please be aware that coverage of these services is subject to the terms and limitations of your health insurance plan.  Call member services at your health plan with any benefit or coverage questions.  Any procedures must be performed at a [facility] facility OR coordinated by your clinic's referral office.    Please bring the following with you to your appointment:    (1) Any X-Rays, CTs or MRIs which have been performed.  Contact the facility where they were done to arrange for  prior to your scheduled appointment.    (2) List of current medications   (3) This referral request   (4) Any documents/labs given to you for this referral                  Who to contact     If you have questions or need follow up information about today's clinic visit or your schedule please contact [facility] directly at [phone].  Normal or non-critical lab and imaging results will be communicated to you by MyChart, letter or phone within 4 business days after the  clinic has received the results. If you do not hear from us within 7 days, please contact the clinic through [employer] or phone. If you have a critical or abnormal lab result, we will notify you by phone as soon as possible.  Submit refill requests through [employer] or call your pharmacy and they will forward the refill request to us. Please allow 3 business days for your refill to be completed.          Additional Information About Your Visit        Care EveryWhere ID     This is your Care EveryWhere ID. This could be used by other organizations to access your [place] medical records  [id number]        Your Vitals Were     Pulse Temperature Respirations BMI (Body Mass Index)          66 98.2  F (36.8  C) (Temporal) 16 34.24 kg/m2         Blood Pressure from Last 3 Encounters:   06/19/18 132/80   06/11/18 (!) 160/96   06/01/18 130/78    Weight from Last 3 Encounters:   06/19/18 217 lb (98.4 kg)   06/11/18 210 lb (95.3 kg)   06/01/18 220 lb (99.8 kg)              We Performed the Following     **ESR FUTURE anytime     **Hepatic panel FUTURE 2mo     CRP, inflammation     GASTROENTEROLOGY ADULT REF PROCEDURE ONLY [facility] [phone]; No Provider Preference     GGT     Hemoglobin          Today's Medication Changes          These changes are accurate as of 6/19/18 12:25 PM.  If you have any questions, ask your nurse or doctor.               Start taking these medicines.        Dose/Directions    olopatadine 0.1 % ophthalmic solution   Commonly known as:  PATANOL   Used for:  Allergic conjunctivitis, bilateral   Started by:  Jessee Royal MD        Dose:  1 drop   Place 1 drop into both eyes 2 times daily   Quantity:  5 mL   Refills:  3       ranitidine 300 MG tablet   Commonly known as:  ZANTAC   Used for:  Other acute gastritis without hemorrhage   Started by:  Jessee Royal MD        Dose:  300 mg   Take 1 tablet (300 mg) by mouth At Bedtime   Quantity:  60 tablet   Refills:  3            Where  to get your medicines      These medications were sent to [facility], [place] - [address]   [address] , [facility] [address]     Phone:  [phone]     olopatadine 0.1 % ophthalmic solution    ranitidine 300 MG tablet                Primary Care Provider Office Phone # Fax #    [facility] [phone] [address]  [facility] [address]        Equal Access to Services     SELINA LÓPEZ : Hadii aad ku hadasho Soomaali, waaxda luqadaha, qaybta kaalmada adeegyada, waxay idiin hayaan adeeg kharash la'aan . So [facility] [phone].    ATENCIÓN: Si habla español, tiene a bruce disposición servicios gratuitos de asistencia lingüística. [facility] [phone].    We comply with applicable federal civil rights laws and [place] laws. We do not discriminate on the basis of race, color, national origin, age, disability, sex, sexual orientation, or gender identity.            Thank you!     Thank you for choosing [facility]  for your care. Our goal is always to provide you with excellent care. Hearing back from our patients is one way we can continue to improve our services. Please take a few minutes to complete the written survey that you may receive in the mail after your visit with us. Thank you!             Your Updated Medication List - Protect others around you: Learn how to safely use, store and throw away your medicines at www.disposemymeds.org.          This list is accurate as of 6/19/18 12:25 PM.  Always use your most recent med list.                   Brand Name Dispense Instructions for use Diagnosis    atorvastatin 20 MG tablet    LIPITOR    90 tablet    Take 1 tablet (20 mg) by mouth daily    Hyperlipidemia LDL goal <130       olopatadine 0.1 % ophthalmic solution    PATANOL    5 mL    Place 1 drop into both eyes 2 times daily    Allergic conjunctivitis, bilateral       ranitidine 300 MG tablet    ZANTAC    60 tablet    Take 1  tablet (300 mg) by mouth At Bedtime    Other acute gastritis without hemorrhage

## 2018-06-19 NOTE — TELEPHONE ENCOUNTER
The patient called back and information has been given.   Thank you,  Tierra Monsalve   for [facility]

## 2018-06-19 NOTE — TELEPHONE ENCOUNTER
----- Message from Jessee Royal MD sent at 6/19/2018  2:00 PM CDT -----  Please call patient with following results.  Let Ronknow that all his labs were completely normal so this is very reassuring that something bad isnt going on.    Electronically signed by:  Jessee Royal M.D.  6/19/2018

## 2018-06-19 NOTE — PROGRESS NOTES
SUBJECTIVE:   Alex Lynch is a 48 year old male who presents to clinic today for the following health issues:      ED/UC Followup:    Facility:  [facility]  Date of visit: 6/11/2018  Reason for visit:  RUQ abdominal pain  Current Status:  Stable but having some nausea and bloating.        Feels like his stomach is raw and has a heartburn like sensation.  Patient was seen in the ED on 6/11/2018 her right upper quadrant pain.  He said this pain was similar to the pain that he has had in the past when he had an episode of duodenitis.  It seemed a little bit more intense and lasted for a few more days and typically lasts.  Denies any vomiting or change in the color of his stools i.e. no blood or dark tarry color in his stool.  He does state that he drinks a lot of Mountain Dew throughout the day and 3 cups of coffee in the morning, but has avoided drinking some of these things because that is upsetting his stomach more.  He does occasionally have a Ag on the weekends but again has been avoiding alcohol since that has not set well with him either.  Just does not feel like he can eat like he normally does because of the stomach issues.    Problem list and histories reviewed & adjusted, as indicated.  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      No  Known Allergies  Recent Labs   Lab Test  06/01/18   1233  01/05/18   1056  06/30/14   2311   LDL  167*  196*   --    HDL  54  45   --    TRIG  113  146   --    ALT   --   43  51   CR   --   1.01  1.14   GFRESTIMATED   --   78  69   GFRESTBLACK   --   >90  84   POTASSIUM   --   4.4  4.4      BP Readings from Last 3 Encounters:   06/19/18 132/80   06/11/18 (!) 160/96   06/01/18 130/78    Wt Readings from Last 3 Encounters:   06/19/18 217 lb (98.4 kg)   06/11/18 210 lb (95.3 kg)   06/01/18 220 lb (99.8 kg)                  Labs reviewed in EPIC    Reviewed and updated as needed this visit by clinical staff  Allergies  Meds  Problems       Reviewed and updated as needed this visit by Provider         ROS:  Constitutional, HEENT, cardiovascular, pulmonary, gi and gu systems are negative, except as otherwise noted.    OBJECTIVE:     /80  Pulse 66  Temp 98.2  F (36.8  C) (Temporal)  Resp 16  Wt 217 lb (98.4 kg)  BMI 34.24 kg/m2  Body mass index is 34.24 kg/(m^2).  GENERAL: healthy, alert and no distress  EYES: Both of his eyes are bloodshot and irritated with inflammation of the conjunctiva.  He is got puffiness under both eyes and looks like he is having an acute allergic conjunctivitis.  NECK: no adenopathy, no asymmetry, masses, or scars and thyroid normal to palpation  RESP: lungs clear to auscultation - no rales, rhonchi or wheezes  CV: regular rate and rhythm, normal S1 S2, no S3 or S4, no murmur, click or rub, no peripheral edema and peripheral pulses strong  ABDOMEN: Abdomen is obese bowel sounds are present throughout and he is soft with tenderness in the right upper quadrant and epigastric region.    I did send him to the lab for blood work today and we are scheduling him for an upper and lower endoscopy.    ASSESSMENT/PLAN:   (Z09) Hospital discharge follow-up  Comment: His pain is improved since being discharged from the ED but he continues to have this nauseated feeling and a raw sensation like  he is having constant heartburn.  Plan: See below    (R10.11) RUQ abdominal pain  (K29.00) Other acute gastritis without hemorrhage  Comment: I am concerned that she has something going on in his stomach not only could this be a gastritis or just bad esophageal reflux but with his pain I am concerned that we have to look to make sure there is not an inflammatory process going on that could be leading to something more serious.  He is at the age now where risk of gastric cancer is also present so we need to rule that out.  Plan: **Hepatic panel FUTURE 2mo, GGT, CRP,         inflammation, **ESR FUTURE anytime, Hemoglobin        We can order some labs today and then get him set up for an upper endoscopy, mostly in the start him on ranitidine (ZANTAC) 300 MG tablet at bedtime to see if his symptoms improve.    (Z12.11) Special screening for malignant neoplasms, colon  Comment: He just recently turned 48 last month so we will go ahead and get him scheduled for his screening colonoscopy also.  His mother's father had a history of colon cancer.  Plan: Colon cancer screening through colonoscopy will be arranged.    (H10.13) Allergic conjunctivitis, bilateral  Comment: He is doing some remodeling at his home and has some mold on the basement I think is got an allergic response to this exposure.  Plan: olopatadine (PATANOL) 0.1 % ophthalmic solution        He is willing to try the Patanol drops to see if we can get his eyes to settle down.  They are more watery than itchy but is deathly has some puffiness.  He could add some Zyrtec or Claritin to if it would like but I forgot to mention this to him before he left.       BP Screening:   Last 3 BP Readings:    BP Readings from Last 3 Encounters:   06/19/18 132/80   06/11/18 (!) 160/96   06/01/18 130/78       The following was recommended to the patient:  Re-screen BP within a year and recommended lifestyle modifications  Tobacco Cessation:   reports that he has been smoking  "Cigarettes.  He has a 30.00 pack-year smoking history. He has never used smokeless tobacco.  Tobacco Cessation Action Plan: Information offered: Patient not interested at this time    BMI:   Estimated body mass index is 34.24 kg/(m^2) as calculated from the following:    Height as of 6/1/18: 5' 6.75\" (1.695 m).    Weight as of this encounter: 217 lb (98.4 kg).   Weight management plan: Not addressed today.    Electronically signed by:  Jessee Royal M.D.  6/19/2018    "

## 2018-06-19 NOTE — NURSING NOTE
Chief Complaint   Patient presents with     ER F/U     6/11/2018  RUQ abdominal pain      Abdominal Pain     now his stomach feels really raw and now feeling bloated and has nausea as well     Nausea

## 2018-07-27 ENCOUNTER — SURGERY (OUTPATIENT)
Age: 48
End: 2018-07-27
Payer: COMMERCIAL

## 2018-07-27 ENCOUNTER — HOSPITAL ENCOUNTER (OUTPATIENT)
Facility: AMBULATORY SURGERY CENTER | Age: 48
Discharge: HOME | End: 2018-07-27
Attending: INTERNAL MEDICINE | Admitting: INTERNAL MEDICINE
Payer: COMMERCIAL

## 2018-07-27 VITALS
HEART RATE: 86 BPM | SYSTOLIC BLOOD PRESSURE: 132 MMHG | DIASTOLIC BLOOD PRESSURE: 82 MMHG | TEMPERATURE: 97.6 F | OXYGEN SATURATION: 95 % | RESPIRATION RATE: 16 BRPM

## 2018-07-27 LAB
COLONOSCOPY: NORMAL
UPPER GI ENDOSCOPY: NORMAL

## 2018-07-27 PROCEDURE — 45381 COLONOSCOPY SUBMUCOUS NJX: CPT | Mod: 33 | Performed by: INTERNAL MEDICINE

## 2018-07-27 PROCEDURE — 45385 COLONOSCOPY W/LESION REMOVAL: CPT | Mod: 33 | Performed by: INTERNAL MEDICINE

## 2018-07-27 PROCEDURE — 88305 TISSUE EXAM BY PATHOLOGIST: CPT | Performed by: INTERNAL MEDICINE

## 2018-07-27 PROCEDURE — 45380 COLONOSCOPY AND BIOPSY: CPT | Mod: 33 | Performed by: INTERNAL MEDICINE

## 2018-07-27 PROCEDURE — 43239 EGD BIOPSY SINGLE/MULTIPLE: CPT

## 2018-07-27 PROCEDURE — 45385 COLONOSCOPY W/LESION REMOVAL: CPT

## 2018-07-27 PROCEDURE — G8907 PT DOC NO EVENTS ON DISCHARG: HCPCS

## 2018-07-27 PROCEDURE — G8918 PT W/O PREOP ORDER IV AB PRO: HCPCS

## 2018-07-27 PROCEDURE — 43239 EGD BIOPSY SINGLE/MULTIPLE: CPT | Mod: 51 | Performed by: INTERNAL MEDICINE

## 2018-07-27 PROCEDURE — 45381 COLONOSCOPY SUBMUCOUS NJX: CPT

## 2018-07-27 RX ORDER — LIDOCAINE 40 MG/G
CREAM TOPICAL
Status: DISCONTINUED | OUTPATIENT
Start: 2018-07-27 | End: 2018-07-28 | Stop reason: HOSPADM

## 2018-07-27 RX ORDER — FENTANYL CITRATE 50 UG/ML
INJECTION, SOLUTION INTRAMUSCULAR; INTRAVENOUS PRN
Status: DISCONTINUED | OUTPATIENT
Start: 2018-07-27 | End: 2018-07-27 | Stop reason: HOSPADM

## 2018-07-27 RX ORDER — ONDANSETRON 2 MG/ML
4 INJECTION INTRAMUSCULAR; INTRAVENOUS
Status: DISCONTINUED | OUTPATIENT
Start: 2018-07-27 | End: 2018-07-28 | Stop reason: HOSPADM

## 2018-07-27 RX ADMIN — FENTANYL CITRATE 100 MCG: 50 INJECTION, SOLUTION INTRAMUSCULAR; INTRAVENOUS at 10:49

## 2018-07-27 RX ADMIN — FENTANYL CITRATE 50 MCG: 50 INJECTION, SOLUTION INTRAMUSCULAR; INTRAVENOUS at 11:06

## 2018-07-31 ENCOUNTER — TELEPHONE (OUTPATIENT)
Dept: GASTROENTEROLOGY | Facility: CLINIC | Age: 48
End: 2018-07-31

## 2018-07-31 LAB — COPATH REPORT: NORMAL

## 2018-07-31 NOTE — TELEPHONE ENCOUNTER
"Returned call and spoke to patient. Patient reports that since his upper GI and colonoscopy on 7/27/18, stomach has been upset. Patient stated, \"I'm not in dire straights but my stomach feels raw and upset kind of like a hangover stomach.\" Patient reports that he is eating and drinking okay, and stated, \"But as soon as I eat or drink it gets upset and has a dull ache.\" Patient denies vomiting, diarrhea, black stools, fevers, and chills. Per patient, stools are back to normal and denies blood in stool. Informed patient that a message would be sent to the GI RN[facility] with request to review and follow up with patient. Patient verbalized understanding and was comfortable with plan.    Heavenly Lawrence RN, BSN    "

## 2018-07-31 NOTE — TELEPHONE ENCOUNTER
[facility] Call Center    Phone Message    May a detailed message be left on voicemail: no    Reason for Call: Other: Pt had biopsy 7.27.18 (colonoscopy).  Pt having symptoms with drinking and eating ever since then.  Gets very sick.  Please return call back.      Action Taken: Message routed to:  Adult Clinics: Gastroenterology (GI) p [id number]

## 2018-08-01 NOTE — TELEPHONE ENCOUNTER
Nurse left message to return call if he needs to go over his message from yesterday.    Ela Tejeda RN, BSN, PHN  [facility]  GI/Gen Surg/Hepatology Care Coordinator

## 2018-08-01 NOTE — TELEPHONE ENCOUNTER
Nurse returned call, no answer. Will try again tomorrow.    Ela Tejeda RN, BSN, PHN  [facility]  GI/Gen Surg/Hepatology Care Coordinator

## 2018-08-02 NOTE — TELEPHONE ENCOUNTER
Patient returned call and updated he is doing better, no further assistance needed.     Ela Tejeda RN, BSN, PHN  [facility]  GI/Gen Surg/Hepatology Care Coordinator

## 2019-04-22 ENCOUNTER — HOSPITAL ENCOUNTER (EMERGENCY)
Facility: CLINIC | Age: 49
Discharge: HOME | End: 2019-04-23
Attending: FAMILY MEDICINE | Admitting: FAMILY MEDICINE
Payer: COMMERCIAL

## 2019-04-22 DIAGNOSIS — R10.11 RUQ ABDOMINAL PAIN: ICD-10-CM

## 2019-04-22 DIAGNOSIS — K29.80 ACUTE DUODENITIS: ICD-10-CM

## 2019-04-22 PROCEDURE — 25000131 ZZH RX MED GY IP 250 OP 636 PS 637: Performed by: FAMILY MEDICINE

## 2019-04-22 PROCEDURE — 99283 EMERGENCY DEPT VISIT LOW MDM: CPT | Performed by: FAMILY MEDICINE

## 2019-04-22 PROCEDURE — 99284 EMERGENCY DEPT VISIT MOD MDM: CPT | Mod: Z6 | Performed by: FAMILY MEDICINE

## 2019-04-22 PROCEDURE — 25000132 ZZH RX MED GY IP 250 OP 250 PS 637: Performed by: FAMILY MEDICINE

## 2019-04-22 RX ORDER — OXYCODONE HYDROCHLORIDE 5 MG/1
10 TABLET ORAL ONCE
Status: COMPLETED | OUTPATIENT
Start: 2019-04-22 | End: 2019-04-22

## 2019-04-22 RX ORDER — ONDANSETRON 4 MG/1
4 TABLET, ORALLY DISINTEGRATING ORAL ONCE
Status: COMPLETED | OUTPATIENT
Start: 2019-04-22 | End: 2019-04-22

## 2019-04-22 RX ORDER — ONDANSETRON 4 MG/1
4 TABLET, ORALLY DISINTEGRATING ORAL ONCE
Status: DISCONTINUED | OUTPATIENT
Start: 2019-04-22 | End: 2019-04-22

## 2019-04-22 RX ORDER — OXYCODONE HYDROCHLORIDE 5 MG/1
10 TABLET ORAL ONCE
Status: DISCONTINUED | OUTPATIENT
Start: 2019-04-22 | End: 2019-04-22

## 2019-04-22 RX ADMIN — ONDANSETRON 4 MG: 4 TABLET, ORALLY DISINTEGRATING ORAL at 23:50

## 2019-04-22 RX ADMIN — OXYCODONE HYDROCHLORIDE 10 MG: 5 TABLET ORAL at 23:50

## 2019-04-22 ASSESSMENT — MIFFLIN-ST. JEOR: SCORE: 1833.32

## 2019-04-22 NOTE — ED AVS SNAPSHOT
[facility] Emergency Department  [address] [place] [address]  Phone:  [phone]  Fax:  [phone]                                    Alex Lynch   MRN: [id number]    Department:  [facility] Emergency Department   Date of Visit:  4/22/2019           After Visit Summary Signature Page    I have received my discharge instructions, and my questions have been answered. I have discussed any challenges I see with this plan with the nurse or doctor.    ..........................................................................................................................................  Patient/Patient Representative Signature      ..........................................................................................................................................  Patient Representative Print Name and Relationship to Patient    ..................................................               ................................................  Date                                   Time    ..........................................................................................................................................  Reviewed by Signature/Title    ...................................................              ..............................................  Date                                               Time          22EPIC Rev 08/18

## 2019-04-23 VITALS
BODY MASS INDEX: 32.09 KG/M2 | HEART RATE: 80 BPM | HEIGHT: 69 IN | TEMPERATURE: 97.9 F | WEIGHT: 216.7 LBS | OXYGEN SATURATION: 95 % | SYSTOLIC BLOOD PRESSURE: 139 MMHG | RESPIRATION RATE: 20 BRPM | DIASTOLIC BLOOD PRESSURE: 85 MMHG

## 2019-04-23 RX ORDER — OXYCODONE HYDROCHLORIDE 5 MG/1
5-10 TABLET ORAL EVERY 6 HOURS PRN
Qty: 10 TABLET | Refills: 0 | Status: SHIPPED | OUTPATIENT
Start: 2019-04-23 | End: 2019-09-28

## 2019-04-23 ASSESSMENT — ENCOUNTER SYMPTOMS
EYES NEGATIVE: 1
NAUSEA: 1
PALPITATIONS: 0
CARDIOVASCULAR NEGATIVE: 1
CHILLS: 0
POLYPHAGIA: 0
ENDOCRINE NEGATIVE: 1
HEMATOLOGIC/LYMPHATIC NEGATIVE: 1
DIARRHEA: 0
FEVER: 0
BLOOD IN STOOL: 0
ABDOMINAL PAIN: 1
POLYDIPSIA: 0
MUSCULOSKELETAL NEGATIVE: 1
CONSTIPATION: 0
RESPIRATORY NEGATIVE: 1
FATIGUE: 0
PSYCHIATRIC NEGATIVE: 1
NEUROLOGICAL NEGATIVE: 1

## 2019-04-23 NOTE — ED PROVIDER NOTES
History     Chief Complaint   Patient presents with     Abdominal Pain     HPI  Alex Lynch is a 48 year old male who presents for evaluation of right upper quadrant pain. This is a chronic issue that he has had off and on for the past 20 years. He typically gets these events about 1-2 times per year.  He states that his pain started after eating multiple hot dogs this evening.  He states that he initially was thought to be his gallbladder but he went to see a surgeon who states it was not his gallbladder but I duodenitis.  Patient has had multiple episodes of it over the last several years.  He states that he had an upper endoscopy procedure performed in July of last year and thinks it came up normal as he heard no abnormal results after the procedure was done.  He states that he has had multiple scans of his ultrasound as well as HIDA scan and prior CT scanning.  He states that he prefers not to have blood testing or imaging done if possible.  Patient states that his symptoms are the exact same he always gets when he gets his episodes.  He typically gets just a few pain medicines to help him get through the pain and then it usually resolves on its own and he can resume his normal activities and diet.  He denies any bloody stool or bloody emesis.  He denies fever or chills.  He denied any fall or injury as a reason for his symptoms. The patient does admit to alcohol use on the weekends.      Allergies:  No Known Allergies    Problem List:    Patient Active Problem List    Diagnosis Date Noted     Juvenile osteochondrosis of spine 06/01/2018     Priority: Medium     Hyperlipidemia LDL goal <130 01/05/2018     Priority: Medium     Hypertension goal BP (blood pressure) < 140/90 01/05/2018     Priority: Medium     Obesity, Class I, BMI 30-34.9 01/05/2018     Priority: Medium     History of substance abuse 01/05/2018     Priority: Medium     Methamphetamine, cocaine, marijuana.       Tobacco use disorder  08/03/2016     Priority: Medium        Past Medical History:    Past Medical History:   Diagnosis Date     CVA (cerebral infarction)      Patent foramen ovale      Umbilical hernia        Past Surgical History:    Past Surgical History:   Procedure Laterality Date     ABDOMEN SURGERY      green field filter secondary to blood clots.      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 repair 2001     COLONOSCOPY N/A 7/27/2018    Procedure: COMBINED COLONOSCOPY, SINGLE OR MULTIPLE BIOPSY/POLYPECTOMY BY BIOPSY;;  Surgeon: Matt Emerson MD;  Location: [facility] OR     COLONOSCOPY WITH CO2 INSUFFLATION N/A 7/27/2018    Procedure: COLONOSCOPY WITH CO2 INSUFFLATION;  Colonoscopy and EGD;  Surgeon: Matt Emerson MD;  Location: [facility] OR     COMBINED ESOPHAGOSCOPY, GASTROSCOPY, DUODENOSCOPY (EGD) WITH CO2 INSUFFLATION N/A 7/27/2018    Procedure: COMBINED ESOPHAGOSCOPY, GASTROSCOPY, DUODENOSCOPY (EGD) WITH CO2 INSUFFLATION;;  Surgeon: Matt Emerson MD;  Location: [facility] OR     ENT SURGERY      tonsils and adnoids     ESOPHAGOSCOPY, GASTROSCOPY, DUODENOSCOPY (EGD), COMBINED N/A 7/27/2018    Procedure: COMBINED ESOPHAGOSCOPY, GASTROSCOPY, DUODENOSCOPY (EGD), BIOPSY SINGLE OR MULTIPLE;;  Surgeon: Matt Emerson MD;  Location: [facility] OR     HERNIA REPAIR      bilateral inguinal as child     HERNIA REPAIR, UMBILICAL       ORTHOPEDIC SURGERY      shattered hip R, plates and screws     ORTHOPEDIC SURGERY      right ankle screws       Family History:    History reviewed. No pertinent family history.    Social History:  Marital Status:   [4]  Social History     Tobacco Use     Smoking status: Current Every Day Smoker     Packs/day: 1.00     Years: 30.00     Pack years: 30.00     Types: Cigarettes     Smokeless tobacco: Never Used   Substance Use Topics     Alcohol use: Yes     Comment: every weekend, half a dozen drinks of cocktails      Drug use: No        Medications:      atorvastatin (LIPITOR) 20 MG  "tablet         Review of Systems   Constitutional: Negative for chills, fatigue and fever.   Eyes: Negative.    Respiratory: Negative.    Cardiovascular: Negative.  Negative for chest pain, palpitations and leg swelling.   Gastrointestinal: Positive for abdominal pain (RUQ) and nausea. Negative for blood in stool, constipation and diarrhea.   Endocrine: Negative.  Negative for polydipsia, polyphagia and polyuria.   Genitourinary: Negative.    Musculoskeletal: Negative.    Skin: Negative.    Neurological: Negative.    Hematological: Negative.    Psychiatric/Behavioral: Negative.    All other systems reviewed and are negative.      Physical Exam   BP: 130/90  Pulse: 83  Temp: 97.9  F (36.6  C)  Resp: 20  Height: 175.3 cm (5' 9\")  Weight: 98.3 kg (216 lb 11.2 oz)  SpO2: 95 %      Physical Exam   Constitutional: He is oriented to person, place, and time. He appears well-developed and well-nourished. He appears distressed.   HENT:   Head: Normocephalic and atraumatic.   Mouth/Throat: Oropharynx is clear and moist.   Eyes: Pupils are equal, round, and reactive to light. Conjunctivae and EOM are normal.   Neck: Normal range of motion. Neck supple.   Cardiovascular: Normal rate.   Pulmonary/Chest: Effort normal. No respiratory distress.   Abdominal: Soft. Bowel sounds are normal. He exhibits no distension, no fluid wave and no mass. There is tenderness in the right upper quadrant.       Musculoskeletal: Normal range of motion.   Neurological: He is alert and oriented to person, place, and time.   Skin: Skin is warm. Capillary refill takes less than 2 seconds. No erythema. No pallor.   Psychiatric: He has a normal mood and affect. His behavior is normal. Judgment and thought content normal.   Nursing note and vitals reviewed.      ED Course        Procedures          Critical Care time:  none          No results found for this or any previous visit (from the past 24 hour(s)).    Medications   ondansetron (ZOFRAN-ODT) ODT tab " 4 mg (has no administration in time range)   oxyCODONE (ROXICODONE) tablet 10 mg (has no administration in time range)       Assessments & Plan (with Medical Decision Making)  48-year-old male to the ER secondary concerns of duodenitis type symptoms which she has had recurrently over the last several years.  Patient did not want any imaging or testing done but did desire a short course of pain medication to get him through the typical period of symptoms he has when he gets 1 of these episodes.  We did discuss the likelihood that the alcohol that he had this weekend could be the contributing factor to setting off his symptoms today.  Patient admitted to having 5-6 beers on Saturday night.  Spent some time with him discussing how alcohol can play a role in causing this type of condition.  Discussed the risk of potential bleeding from the duodenitis or gastritis and to monitor for evidence of that.  He was given handouts discussing duodenitis and the signs and symptoms that would suggest the need to return to the ER should he have worsening symptoms.  He has some Prilosec at home have asked him to initiate 40 mg daily before his breakfast meal and to use this for the next 10 days to calm his symptoms down.  He was given a few oxycodone tablets to help with his pain as well.  He was warned not to drive or operate machinery if using this medication.  Of asked him to read the handouts I sent home with him and to return to the ER should he have increase or worsening symptoms as described in those handouts.  I also advised him to avoid any alcohol use until his symptoms have resolved and then not to drink more than 2 alcoholic beverages in a 24-hour period to avoid recurrence of his symptoms.     I have reviewed the nursing notes.    I have reviewed the findings, diagnosis, plan and need for follow up with the patient.          Medication List      Started    omeprazole 20 MG DR capsule  Commonly known as:  priLOSEC  40 mg,  Oral, DAILY, TAKE BEFORE YOUR BREAKFAST MEAL DAILY     oxyCODONE 5 MG tablet  Commonly known as:  ROXICODONE  5-10 mg, Oral, EVERY 6 HOURS PRN                 I verbally discussed the findings of the evaluation today in the ER. I have verbally discussed with Alex the suggested treatment(s) as described in the discharge instructions and handouts. I have prescribed the above listed medications and instructed him on appropriate use of these medications.      I have verbally suggested he follow-up in his clinic or return to the ER for increased symptoms. See the follow-up recommendations documented  in the after visit summary in this visit's EPIC chart.    Final diagnoses:   Acute duodenitis   RUQ abdominal pain       4/22/2019   [facility] EMERGENCY DEPARTMENT     Getachew Man,   04/23/19 0137

## 2019-04-23 NOTE — ED TRIAGE NOTES
"Presents with RUQ pain.  States, \"its my duodenum flare up that happens twice a year. Look back in my chart and you'll see this is the only thing I come to the ER for.\"   "

## 2019-04-23 NOTE — DISCHARGE INSTRUCTIONS
Please read and follow the handout(s) instructions. Return, if needed, for increased or worsening symptoms and as directed by the handout(s).    Avoid drinking more than 2 alcoholic beverages in a 24 hour period to avoid the return of the duodenitis.    I hope you feel better soon!    Electronically signed, Getachew Man DO

## 2019-09-28 ENCOUNTER — HOSPITAL ENCOUNTER (EMERGENCY)
Facility: CLINIC | Age: 49
Discharge: HOME | End: 2019-09-28
Attending: FAMILY MEDICINE | Admitting: FAMILY MEDICINE
Payer: MEDICAID

## 2019-09-28 VITALS
TEMPERATURE: 98.9 F | OXYGEN SATURATION: 99 % | SYSTOLIC BLOOD PRESSURE: 136 MMHG | HEART RATE: 78 BPM | RESPIRATION RATE: 18 BRPM | WEIGHT: 200 LBS | BODY MASS INDEX: 29.53 KG/M2 | DIASTOLIC BLOOD PRESSURE: 117 MMHG

## 2019-09-28 DIAGNOSIS — K29.80 DUODENITIS WITHOUT BLEEDING: ICD-10-CM

## 2019-09-28 DIAGNOSIS — R10.11 RUQ ABDOMINAL PAIN: ICD-10-CM

## 2019-09-28 PROCEDURE — 99283 EMERGENCY DEPT VISIT LOW MDM: CPT | Performed by: FAMILY MEDICINE

## 2019-09-28 PROCEDURE — 99284 EMERGENCY DEPT VISIT MOD MDM: CPT | Mod: Z6 | Performed by: FAMILY MEDICINE

## 2019-09-28 RX ORDER — OXYCODONE HYDROCHLORIDE 5 MG/1
5-10 TABLET ORAL EVERY 6 HOURS PRN
Qty: 10 TABLET | Refills: 0 | Status: SHIPPED | OUTPATIENT
Start: 2019-09-28 | End: 2019-10-01

## 2019-09-28 ASSESSMENT — ENCOUNTER SYMPTOMS
ABDOMINAL PAIN: 1
EYES NEGATIVE: 1
FEVER: 0
DYSURIA: 0
NAUSEA: 0
VOMITING: 0
CARDIOVASCULAR NEGATIVE: 1
CHILLS: 0
NEUROLOGICAL NEGATIVE: 1
RESPIRATORY NEGATIVE: 1
DIARRHEA: 0
SHORTNESS OF BREATH: 0
HEMATURIA: 0
PSYCHIATRIC NEGATIVE: 1
APPETITE CHANGE: 1
BLOOD IN STOOL: 0
MUSCULOSKELETAL NEGATIVE: 1
FLANK PAIN: 0
ABDOMINAL DISTENTION: 0
ANAL BLEEDING: 0

## 2019-09-28 NOTE — ED AVS SNAPSHOT
[facility] Emergency Department  [address] [place] [address]  Phone:  [phone]  Fax:  [phone]                                    Alex Lynch   MRN: [id number]    Department:  [facility] Emergency Department   Date of Visit:  9/28/2019           After Visit Summary Signature Page    I have received my discharge instructions, and my questions have been answered. I have discussed any challenges I see with this plan with the nurse or doctor.    ..........................................................................................................................................  Patient/Patient Representative Signature      ..........................................................................................................................................  Patient Representative Print Name and Relationship to Patient    ..................................................               ................................................  Date                                   Time    ..........................................................................................................................................  Reviewed by Signature/Title    ...................................................              ..............................................  Date                                               Time          22EPIC Rev 08/18

## 2019-09-29 NOTE — ED TRIAGE NOTES
"Patient with R sided abdominal pain after eating dinner tonight. States every 6 months or so he has an issue with his \"duodenum acting up\".   "

## 2019-09-29 NOTE — ED PROVIDER NOTES
History     Chief Complaint   Patient presents with     Abdominal Pain     HPI  Alex Lynch is a 49 year old male who presents to the ER with concerns about RUQ abd pain.  He states that he did not eat anything all day while at work and when he got home he had a peanut butter and jelly sandwich and about an hour later the pain started in the right upper abdomen.  The patient has been seen multiple times with similar concerns in the past.  He states that he has had similar episodes for the last 7 to 10 years.  His last visit for this concern, he was seen by myself, in this ER, in April, 2019. He was diagnosed with a duodenitis at that time. He states the symptoms seem to reoccur every 6 months or so. He has had multiple work-ups done in the past for the RUQ abd pain symptoms including upper and lower endoscopy exams along with U/S's, HIDA scans, and CT scans of the abdomen.  He states that he would prefer that no blood tests or x-ray exams to be done as cost him extra money.  Patient states that he drove himself to the ER. When asked if he had any fall or injury as reason for the pain he states he did fall off of his deck on July 4th and injured his ribs but that injury has now resolved.       I reviewed the following nurse triage note:      I did query the [place] controlled substance database. The patient was listed as having filled 5 controlled prescriptions in the last year.      Allergies:  No Known Allergies    Problem List:    Patient Active Problem List    Diagnosis Date Noted     Juvenile osteochondrosis of spine 06/01/2018     Priority: Medium     Hyperlipidemia LDL goal <130 01/05/2018     Priority: Medium     Hypertension goal BP (blood pressure) < 140/90 01/05/2018     Priority: Medium     Obesity, Class I, BMI 30-34.9 01/05/2018     Priority: Medium     History of substance abuse 01/05/2018     Priority: Medium     Methamphetamine, cocaine, marijuana.       Tobacco use disorder 08/03/2016      Priority: Medium        Past Medical History:    Past Medical History:   Diagnosis Date     CVA (cerebral infarction)      Patent foramen ovale      Umbilical hernia        Past Surgical History:    Past Surgical History:   Procedure Laterality Date     ABDOMEN SURGERY      green field filter secondary to blood clots.      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 repair 2001     COLONOSCOPY N/A 7/27/2018    Procedure: COMBINED COLONOSCOPY, SINGLE OR MULTIPLE BIOPSY/POLYPECTOMY BY BIOPSY;;  Surgeon: Matt Emerson MD;  Location: [facility] OR     COLONOSCOPY WITH CO2 INSUFFLATION N/A 7/27/2018    Procedure: COLONOSCOPY WITH CO2 INSUFFLATION;  Colonoscopy and EGD;  Surgeon: Matt Emerson MD;  Location: [facility] OR     COMBINED ESOPHAGOSCOPY, GASTROSCOPY, DUODENOSCOPY (EGD) WITH CO2 INSUFFLATION N/A 7/27/2018    Procedure: COMBINED ESOPHAGOSCOPY, GASTROSCOPY, DUODENOSCOPY (EGD) WITH CO2 INSUFFLATION;;  Surgeon: Matt Emerson MD;  Location: [facility] OR     ENT SURGERY      tonsils and adnoids     ESOPHAGOSCOPY, GASTROSCOPY, DUODENOSCOPY (EGD), COMBINED N/A 7/27/2018    Procedure: COMBINED ESOPHAGOSCOPY, GASTROSCOPY, DUODENOSCOPY (EGD), BIOPSY SINGLE OR MULTIPLE;;  Surgeon: Matt Emerson MD;  Location: [facility] OR     HERNIA REPAIR      bilateral inguinal as child     HERNIA REPAIR, UMBILICAL       ORTHOPEDIC SURGERY      shattered hip R, plates and screws     ORTHOPEDIC SURGERY      right ankle screws       Family History:    History reviewed. No pertinent family history.    Social History:  Marital Status:   [4]  Social History     Tobacco Use     Smoking status: Current Every Day Smoker     Packs/day: 1.00     Years: 30.00     Pack years: 30.00     Types: Cigarettes     Smokeless tobacco: Never Used   Substance Use Topics     Alcohol use: Yes     Comment: every weekend, half a dozen drinks of cocktails      Drug use: No        Medications:    oxyCODONE (ROXICODONE) 5 MG  tablet  atorvastatin (LIPITOR) 20 MG tablet      Review of Systems   Constitutional: Positive for appetite change. Negative for chills and fever.   HENT: Negative.    Eyes: Negative.    Respiratory: Negative.  Negative for shortness of breath.    Cardiovascular: Negative.    Gastrointestinal: Positive for abdominal pain (RUQ). Negative for abdominal distention, anal bleeding, blood in stool, diarrhea, nausea and vomiting.   Genitourinary: Negative for dysuria, flank pain and hematuria.   Musculoskeletal: Negative.    Skin: Negative.  Negative for rash.   Neurological: Negative.    Psychiatric/Behavioral: Negative.    All other systems reviewed and are negative.      Physical Exam   BP: (!) 136/117  Pulse: 78  Heart Rate: 78  Temp: 98.9  F (37.2  C)  Resp: 18  Weight: 90.7 kg (200 lb)  SpO2: 99 %      Physical Exam  Vitals signs and nursing note reviewed.   Constitutional:       General: He is in acute distress.      Appearance: He is well-developed. He is not ill-appearing, toxic-appearing or diaphoretic.   HENT:      Head: Normocephalic and atraumatic.      Mouth/Throat:      Mouth: Mucous membranes are moist.      Pharynx: Oropharynx is clear.   Eyes:      Extraocular Movements: Extraocular movements intact.      Pupils: Pupils are equal, round, and reactive to light.   Cardiovascular:      Rate and Rhythm: Normal rate.      Heart sounds: Murmur present.   Pulmonary:      Effort: Pulmonary effort is normal. No respiratory distress.   Abdominal:      General: Abdomen is protuberant. There is no distension or abdominal bruit. There are no signs of injury.      Palpations: Abdomen is soft. There is no hepatomegaly or mass.      Tenderness: There is tenderness in the right upper quadrant. There is no right CVA tenderness, left CVA tenderness, guarding or rebound.      Hernia: There is no hernia in the umbilical area or ventral area.   Skin:     General: Skin is warm.      Findings: No rash.   Neurological:       General: No focal deficit present.      Mental Status: He is alert.   Psychiatric:         Mood and Affect: Mood normal. Mood is not anxious or depressed.         Behavior: Behavior normal.         ED Course        Procedures               Critical Care time:  none            Assessments & Plan (with Medical Decision Making)  49yr old to the ER with concerns about recurrent RUQ abd pain symptoms. He has had similar events on and off for many years. He has had multiple extensive work-ups in the past. Prior diagnosis of a duodenitis. He usually is treated with a short course of pain medications with resolution of the symptoms in 2-3 days. He requests no blood or imaging testing today. Exam findings consistent with his prior visit exam. Will treat with a short course of oxycodone. He plans follow-up in his [facility] clinic if not improved in 2-3 days. To return to the ER if increased or worsening symptoms as directed on the handouts given to him. No medications given to him in the ER per his request.     I have reviewed the nursing notes.    I have reviewed the findings, diagnosis, plan and need for follow up with the patient.       Discharge Medication List as of 9/28/2019  8:01 PM          Final diagnoses:   Duodenitis without bleeding - Recurrent   RUQ abdominal pain       9/28/2019   [facility] EMERGENCY DEPARTMENT     Getachew Man,   09/28/19 2028

## 2019-11-21 ENCOUNTER — HOSPITAL ENCOUNTER (EMERGENCY)
Facility: CLINIC | Age: 49
Discharge: HOME | End: 2019-11-21
Attending: EMERGENCY MEDICINE | Admitting: EMERGENCY MEDICINE
Payer: COMMERCIAL

## 2019-11-21 ENCOUNTER — APPOINTMENT (OUTPATIENT)
Dept: GENERAL RADIOLOGY | Facility: CLINIC | Age: 49
End: 2019-11-21
Attending: EMERGENCY MEDICINE
Payer: COMMERCIAL

## 2019-11-21 VITALS
BODY MASS INDEX: 29.53 KG/M2 | OXYGEN SATURATION: 93 % | RESPIRATION RATE: 23 BRPM | SYSTOLIC BLOOD PRESSURE: 139 MMHG | DIASTOLIC BLOOD PRESSURE: 92 MMHG | WEIGHT: 200 LBS | TEMPERATURE: 98.1 F | HEART RATE: 71 BPM

## 2019-11-21 DIAGNOSIS — R07.89 ATYPICAL CHEST PAIN: ICD-10-CM

## 2019-11-21 LAB
ALBUMIN SERPL-MCNC: 3.6 G/DL (ref 3.4–5)
ALP SERPL-CCNC: 83 U/L (ref 40–150)
ALT SERPL W P-5'-P-CCNC: 37 U/L (ref 0–70)
ANION GAP SERPL CALCULATED.3IONS-SCNC: 5 MMOL/L (ref 3–14)
AST SERPL W P-5'-P-CCNC: 19 U/L (ref 0–45)
BASOPHILS # BLD AUTO: 0.1 10E9/L (ref 0–0.2)
BASOPHILS NFR BLD AUTO: 0.9 %
BILIRUB SERPL-MCNC: 0.3 MG/DL (ref 0.2–1.3)
BUN SERPL-MCNC: 10 MG/DL (ref 7–30)
CALCIUM SERPL-MCNC: 8.3 MG/DL (ref 8.5–10.1)
CHLORIDE SERPL-SCNC: 102 MMOL/L (ref 94–109)
CO2 SERPL-SCNC: 31 MMOL/L (ref 20–32)
CREAT SERPL-MCNC: 1.09 MG/DL (ref 0.66–1.25)
DIFFERENTIAL METHOD BLD: NORMAL
EOSINOPHIL NFR BLD AUTO: 3 %
ERYTHROCYTE [DISTWIDTH] IN BLOOD BY AUTOMATED COUNT: 13.1 % (ref 10–15)
GFR SERPL CREATININE-BSD FRML MDRD: 78 ML/MIN/{1.73_M2}
GLUCOSE SERPL-MCNC: 110 MG/DL (ref 70–99)
HCT VFR BLD AUTO: 50.4 % (ref 40–53)
HGB BLD-MCNC: 16.7 G/DL (ref 13.3–17.7)
IMM GRANULOCYTES # BLD: 0 10E9/L (ref 0–0.4)
IMM GRANULOCYTES NFR BLD: 0.3 %
LIPASE SERPL-CCNC: 152 U/L (ref 73–393)
LYMPHOCYTES # BLD AUTO: 1.8 10E9/L (ref 0.8–5.3)
LYMPHOCYTES NFR BLD AUTO: 23.8 %
MCH RBC QN AUTO: 31.1 PG (ref 26.5–33)
MCHC RBC AUTO-ENTMCNC: 33.1 G/DL (ref 31.5–36.5)
MCV RBC AUTO: 94 FL (ref 78–100)
MONOCYTES # BLD AUTO: 0.8 10E9/L (ref 0–1.3)
MONOCYTES NFR BLD AUTO: 10.9 %
NEUTROPHILS # BLD AUTO: 4.5 10E9/L (ref 1.6–8.3)
NEUTROPHILS NFR BLD AUTO: 61.1 %
NRBC # BLD AUTO: 0 10*3/UL
NRBC BLD AUTO-RTO: 0 /100
PLATELET # BLD AUTO: 227 10E9/L (ref 150–450)
POTASSIUM SERPL-SCNC: 4.3 MMOL/L (ref 3.4–5.3)
PROT SERPL-MCNC: 7.5 G/DL (ref 6.8–8.8)
RBC # BLD AUTO: 5.37 10E12/L (ref 4.4–5.9)
SODIUM SERPL-SCNC: 138 MMOL/L (ref 133–144)
TROPONIN I SERPL-MCNC: <0.015 UG/L (ref 0–0.04)
WBC # BLD AUTO: 7.4 10E9/L (ref 4–11)

## 2019-11-21 PROCEDURE — 93005 ELECTROCARDIOGRAM TRACING: CPT | Performed by: EMERGENCY MEDICINE

## 2019-11-21 PROCEDURE — 85025 COMPLETE CBC W/AUTO DIFF WBC: CPT | Performed by: EMERGENCY MEDICINE

## 2019-11-21 PROCEDURE — 71046 X-RAY EXAM CHEST 2 VIEWS: CPT | Mod: TC

## 2019-11-21 PROCEDURE — 83690 ASSAY OF LIPASE: CPT | Performed by: EMERGENCY MEDICINE

## 2019-11-21 PROCEDURE — 99284 EMERGENCY DEPT VISIT MOD MDM: CPT | Mod: 25 | Performed by: EMERGENCY MEDICINE

## 2019-11-21 PROCEDURE — 93010 ELECTROCARDIOGRAM REPORT: CPT | Mod: Z6 | Performed by: EMERGENCY MEDICINE

## 2019-11-21 PROCEDURE — 99285 EMERGENCY DEPT VISIT HI MDM: CPT | Performed by: EMERGENCY MEDICINE

## 2019-11-21 PROCEDURE — 84484 ASSAY OF TROPONIN QUANT: CPT | Performed by: EMERGENCY MEDICINE

## 2019-11-21 PROCEDURE — 80053 COMPREHEN METABOLIC PANEL: CPT | Performed by: EMERGENCY MEDICINE

## 2019-11-21 NOTE — DISCHARGE INSTRUCTIONS
Continue to monitor your symptoms and if you have any significant worsening, changes or concerns return at any time.    Please make an appointment to follow-up with primary care provider as soon as possible.  You can make an appointment on the way out.    I hope that this improves quickly and you enjoy your holidays!!

## 2019-11-21 NOTE — ED AVS SNAPSHOT
[facility] Emergency Department  [address] [place] [address]  Phone:  [phone]  Fax:  [phone]                                    Alex Lynch   MRN: [id number]    Department:  [facility] Emergency Department   Date of Visit:  11/21/2019           After Visit Summary Signature Page    I have received my discharge instructions, and my questions have been answered. I have discussed any challenges I see with this plan with the nurse or doctor.    ..........................................................................................................................................  Patient/Patient Representative Signature      ..........................................................................................................................................  Patient Representative Print Name and Relationship to Patient    ..................................................               ................................................  Date                                   Time    ..........................................................................................................................................  Reviewed by Signature/Title    ...................................................              ..............................................  Date                                               Time          22EPIC Rev 08/18

## 2019-11-21 NOTE — ED PROVIDER NOTES
"  History     Chief Complaint   Patient presents with     Chest Pain     HPI  History per patient and medical records    This is a 49-year-old male with history of hypertension, hyperlipidemia, tobacco use presenting with chest pain.  Patient has been having pain in his epigastric area that started last night.  He states that it \"feels like a bubble going up then dissipating\".  His discomfort starts in the upper abdomen and then seems to travel up to the midesophagus before it goes away.  He has not been burping more than usual.  He felt a little more gassy yesterday but not today.  The episode have been all night long and woke him from sleep a few times.  He was unable to find a specific position where it would not occur.  He denies any lightheadedness, shortness of breath, cold or cough symptoms, nausea or vomiting.  He does smoke 1 pack/day and is being treated for hyperlipidemia.  He has a diagnosis of hypertension but is not on hypertensive medications.  He has a remote history of PE after a pelvic fracture and had a Rubén filter placed in 2000.  He is not on blood thinners.  No history of abdominal surgeries.  He also had repair of a PFO in the past.    Allergies:  No Known Allergies    Problem List:    Patient Active Problem List    Diagnosis Date Noted     Juvenile osteochondrosis of spine 06/01/2018     Priority: Medium     Hyperlipidemia LDL goal <130 01/05/2018     Priority: Medium     Hypertension goal BP (blood pressure) < 140/90 01/05/2018     Priority: Medium     Obesity, Class I, BMI 30-34.9 01/05/2018     Priority: Medium     History of substance abuse (H) 01/05/2018     Priority: Medium     Methamphetamine, cocaine, marijuana.       Tobacco use disorder 08/03/2016     Priority: Medium        Past Medical History:    Past Medical History:   Diagnosis Date     CVA (cerebral infarction)      Patent foramen ovale      Umbilical hernia        Past Surgical History:    Past Surgical History: "   Procedure Laterality Date     ABDOMEN SURGERY      green field filter secondary to blood clots.      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 repair 2001     COLONOSCOPY N/A 7/27/2018    Procedure: COMBINED COLONOSCOPY, SINGLE OR MULTIPLE BIOPSY/POLYPECTOMY BY BIOPSY;;  Surgeon: Matt Emerson MD;  Location: [facility]     COLONOSCOPY WITH CO2 INSUFFLATION N/A 7/27/2018    Procedure: COLONOSCOPY WITH CO2 INSUFFLATION;  Colonoscopy and EGD;  Surgeon: Matt Emerson MD;  Location: [facility]     COMBINED ESOPHAGOSCOPY, GASTROSCOPY, DUODENOSCOPY (EGD) WITH CO2 INSUFFLATION N/A 7/27/2018    Procedure: COMBINED ESOPHAGOSCOPY, GASTROSCOPY, DUODENOSCOPY (EGD) WITH CO2 INSUFFLATION;;  Surgeon: Matt Emerson MD;  Location: [facility]     ENT SURGERY      tonsils and adnoids     ESOPHAGOSCOPY, GASTROSCOPY, DUODENOSCOPY (EGD), COMBINED N/A 7/27/2018    Procedure: COMBINED ESOPHAGOSCOPY, GASTROSCOPY, DUODENOSCOPY (EGD), BIOPSY SINGLE OR MULTIPLE;;  Surgeon: Matt Emerson MD;  Location: [facility]     HERNIA REPAIR      bilateral inguinal as child     HERNIA REPAIR, UMBILICAL       ORTHOPEDIC SURGERY      shattered hip R, plates and screws     ORTHOPEDIC SURGERY      right ankle screws       Family History:    No family history on file.    Social History:  Marital Status:   [4]  Social History     Tobacco Use     Smoking status: Current Every Day Smoker     Packs/day: 1.00     Years: 30.00     Pack years: 30.00     Types: Cigarettes     Smokeless tobacco: Never Used   Substance Use Topics     Alcohol use: Yes     Comment: every weekend, half a dozen drinks of cocktails      Drug use: No        Medications:    atorvastatin (LIPITOR) 20 MG tablet      Review of Systems   All other ROS reviewed and are negative or non-contributory except as stated in HPI.     Physical Exam   BP: (!) 166/107  Pulse: 86  Heart Rate: 78  Temp: 98.1  F (36.7  C)  Resp: 14  Weight: 90.7 kg (200 lb)  SpO2: 98  %      Physical Exam  Vitals signs and nursing note reviewed.   Constitutional:       Appearance: He is well-developed. He is obese.      Comments: Mildly anxious but otherwise healthy appearing male sitting in the bed.  He has a hoarse voice   HENT:      Head: Normocephalic.      Nose: Nose normal.      Mouth/Throat:      Mouth: Mucous membranes are moist.      Pharynx: Oropharynx is clear.   Eyes:      Extraocular Movements: Extraocular movements intact.      Pupils: Pupils are equal, round, and reactive to light.   Neck:      Musculoskeletal: Normal range of motion and neck supple.   Cardiovascular:      Rate and Rhythm: Normal rate and regular rhythm.      Pulses: Normal pulses.      Heart sounds: Normal heart sounds.   Pulmonary:      Effort: Pulmonary effort is normal.      Breath sounds: Normal breath sounds.   Abdominal:      Comments: Abdomen is soft and round.  He has hyperactive bowel sounds.  Nontender.   Musculoskeletal: Normal range of motion.         General: No tenderness.      Right lower leg: No edema.      Left lower leg: No edema.   Skin:     General: Skin is warm and dry.      Findings: Rash:      Neurological:      General: No focal deficit present.      Mental Status: He is alert and oriented to person, place, and time.   Psychiatric:         Behavior: Behavior normal.         Thought Content: Thought content normal.         ED Course (with Medical Decision Making)    Pt seen and examined by me.  RN and EPIC notes reviewed.        Patient with some atypical chest pain.  It sounds like some gas pain although he has not been [place].  I did do an EKG which showed normal sinus rhythm with no acute ST segment abnormalities.  I also checked chest x-ray and labs.    These were all normal.    Results were discussed with the patient.  I recommend that he continue to monitor his symptoms.  I suspect this will improve.  However, if he has any worsening, changes or concerns he can return at any time.  I  encouraged him to stop smoking.  I also recommend that he set up care with a primary care provider.  He may need a stress test in the future as he has a lot of risk factors and unusual symptoms.        Procedures            EKG Interpretation:      Interpreted by Soniya Hou MD  Time reviewed: 1042  Symptoms at time of EKG: none   Rhythm: normal sinus   Rate: normal  Axis: normal  Ectopy: none  Conduction: normal  ST Segments/ T Waves: No ST-T wave changes  Q Waves: none  Comparison to prior: No old EKG available    Clinical Impression: normal EKG            Results for orders placed or performed during the hospital encounter of 11/21/19 (from the past 24 hour(s))   CBC with platelets differential   Result Value Ref Range    WBC 7.4 4.0 - 11.0 10e9/L    RBC Count 5.37 4.4 - 5.9 10e12/L    Hemoglobin 16.7 13.3 - 17.7 g/dL    Hematocrit 50.4 40.0 - 53.0 %    MCV 94 78 - 100 fl    MCH 31.1 26.5 - 33.0 pg    MCHC 33.1 31.5 - 36.5 g/dL    RDW 13.1 10.0 - 15.0 %    Platelet Count 227 150 - 450 10e9/L    Diff Method Automated Method     % Neutrophils 61.1 %    % Lymphocytes 23.8 %    % Monocytes 10.9 %    % Eosinophils 3.0 %    % Basophils 0.9 %    % Immature Granulocytes 0.3 %    Nucleated RBCs 0 0 /100    Absolute Neutrophil 4.5 1.6 - 8.3 10e9/L    Absolute Lymphocytes 1.8 0.8 - 5.3 10e9/L    Absolute Monocytes 0.8 0.0 - 1.3 10e9/L    Absolute Basophils 0.1 0.0 - 0.2 10e9/L    Abs Immature Granulocytes 0.0 0 - 0.4 10e9/L    Absolute Nucleated RBC 0.0    Comprehensive metabolic panel   Result Value Ref Range    Sodium 138 133 - 144 mmol/L    Potassium 4.3 3.4 - 5.3 mmol/L    Chloride 102 94 - 109 mmol/L    Carbon Dioxide 31 20 - 32 mmol/L    Anion Gap 5 3 - 14 mmol/L    Glucose 110 (H) 70 - 99 mg/dL    Urea Nitrogen 10 7 - 30 mg/dL    Creatinine 1.09 0.66 - 1.25 mg/dL    GFR Estimate 78 >60 mL/min/[1.73_m2]    GFR Estimate If Black >90 >60 mL/min/[1.73_m2]    Calcium 8.3 (L) 8.5 - 10.1 mg/dL    Bilirubin Total 0.3  0.2 - 1.3 mg/dL    Albumin 3.6 3.4 - 5.0 g/dL    Protein Total 7.5 6.8 - 8.8 g/dL    Alkaline Phosphatase 83 40 - 150 U/L    ALT 37 0 - 70 U/L    AST 19 0 - 45 U/L   Lipase   Result Value Ref Range    Lipase 152 73 - 393 U/L   Troponin I   Result Value Ref Range    Troponin I ES <0.015 0.000 - 0.045 ug/L   XR Chest 2 Views    Narrative    CHEST TWO VIEWS November 21, 2019 11:14 AM     HISTORY: Chest pain.     COMPARISON: 1/5/2018.     FINDINGS: Cardiomediastinal silhouette within normal limits. Cardiac  valvular prosthesis again present. IVC filter partially visualized. No  focal airspace opacities. No pleural effusion. No pneumothorax  identified. The bones are unremarkable.       Impression    IMPRESSION: No acute cardiopulmonary abnormalities.     NEREYDA JOYNER MD       Medications - No data to display    Assessments & Plan     I have reviewed the findings, diagnosis, plan and need for follow up with the patient.    Discharge Medication List as of 11/21/2019 12:15 PM          Final diagnoses:   Atypical chest pain     Disposition: Patient discharged home in stable condition.  Plan as above.  Return for concerns.     Note: Chart documentation done in part with Dragon Voice Recognition software. Although reviewed after completion, some word and grammatical errors may remain.     11/21/2019   [facility] EMERGENCY DEPARTMENT     Soniya Hou MD  11/21/19 8576

## 2020-03-15 ENCOUNTER — HOSPITAL ENCOUNTER (EMERGENCY)
Facility: CLINIC | Age: 50
Discharge: HOME | End: 2020-03-15
Attending: FAMILY MEDICINE | Admitting: FAMILY MEDICINE
Payer: COMMERCIAL

## 2020-03-15 VITALS
BODY MASS INDEX: 32.58 KG/M2 | SYSTOLIC BLOOD PRESSURE: 170 MMHG | DIASTOLIC BLOOD PRESSURE: 103 MMHG | HEIGHT: 69 IN | HEART RATE: 75 BPM | RESPIRATION RATE: 18 BRPM | OXYGEN SATURATION: 98 % | TEMPERATURE: 98.2 F | WEIGHT: 220 LBS

## 2020-03-15 DIAGNOSIS — R10.11 RUQ ABDOMINAL PAIN: ICD-10-CM

## 2020-03-15 LAB
ALBUMIN SERPL-MCNC: 3.7 G/DL (ref 3.4–5)
ALP SERPL-CCNC: 74 U/L (ref 40–150)
ALT SERPL W P-5'-P-CCNC: 41 U/L (ref 0–70)
ANION GAP SERPL CALCULATED.3IONS-SCNC: 4 MMOL/L (ref 3–14)
AST SERPL W P-5'-P-CCNC: 19 U/L (ref 0–45)
BASOPHILS # BLD AUTO: 0.1 10E9/L (ref 0–0.2)
BASOPHILS NFR BLD AUTO: 0.8 %
BILIRUB DIRECT SERPL-MCNC: <0.1 MG/DL (ref 0–0.2)
BILIRUB SERPL-MCNC: 0.3 MG/DL (ref 0.2–1.3)
BUN SERPL-MCNC: 14 MG/DL (ref 7–30)
CALCIUM SERPL-MCNC: 8.7 MG/DL (ref 8.5–10.1)
CHLORIDE SERPL-SCNC: 99 MMOL/L (ref 94–109)
CO2 SERPL-SCNC: 34 MMOL/L (ref 20–32)
CREAT SERPL-MCNC: 1.23 MG/DL (ref 0.66–1.25)
DIFFERENTIAL METHOD BLD: NORMAL
EOSINOPHIL NFR BLD AUTO: 5.3 %
ERYTHROCYTE [DISTWIDTH] IN BLOOD BY AUTOMATED COUNT: 13 % (ref 10–15)
GFR SERPL CREATININE-BSD FRML MDRD: 67 ML/MIN/{1.73_M2}
GLUCOSE SERPL-MCNC: 149 MG/DL (ref 70–99)
HCT VFR BLD AUTO: 44.3 % (ref 40–53)
HGB BLD-MCNC: 14.8 G/DL (ref 13.3–17.7)
IMM GRANULOCYTES # BLD: 0 10E9/L (ref 0–0.4)
IMM GRANULOCYTES NFR BLD: 0.2 %
LIPASE SERPL-CCNC: 245 U/L (ref 73–393)
LYMPHOCYTES # BLD AUTO: 2.1 10E9/L (ref 0.8–5.3)
LYMPHOCYTES NFR BLD AUTO: 33.5 %
MCH RBC QN AUTO: 31.4 PG (ref 26.5–33)
MCHC RBC AUTO-ENTMCNC: 33.4 G/DL (ref 31.5–36.5)
MCV RBC AUTO: 94 FL (ref 78–100)
MONOCYTES # BLD AUTO: 0.6 10E9/L (ref 0–1.3)
MONOCYTES NFR BLD AUTO: 9.4 %
NEUTROPHILS # BLD AUTO: 3.2 10E9/L (ref 1.6–8.3)
NEUTROPHILS NFR BLD AUTO: 50.8 %
NRBC # BLD AUTO: 0 10*3/UL
NRBC BLD AUTO-RTO: 0 /100
PLATELET # BLD AUTO: 226 10E9/L (ref 150–450)
POTASSIUM SERPL-SCNC: 4.2 MMOL/L (ref 3.4–5.3)
PROT SERPL-MCNC: 7.2 G/DL (ref 6.8–8.8)
RBC # BLD AUTO: 4.71 10E12/L (ref 4.4–5.9)
SODIUM SERPL-SCNC: 137 MMOL/L (ref 133–144)
WBC # BLD AUTO: 6.4 10E9/L (ref 4–11)

## 2020-03-15 PROCEDURE — 99284 EMERGENCY DEPT VISIT MOD MDM: CPT | Mod: Z6 | Performed by: FAMILY MEDICINE

## 2020-03-15 PROCEDURE — 80048 BASIC METABOLIC PNL TOTAL CA: CPT | Performed by: FAMILY MEDICINE

## 2020-03-15 PROCEDURE — 99283 EMERGENCY DEPT VISIT LOW MDM: CPT | Performed by: FAMILY MEDICINE

## 2020-03-15 PROCEDURE — 85025 COMPLETE CBC W/AUTO DIFF WBC: CPT | Performed by: FAMILY MEDICINE

## 2020-03-15 PROCEDURE — 80076 HEPATIC FUNCTION PANEL: CPT | Performed by: FAMILY MEDICINE

## 2020-03-15 PROCEDURE — 83690 ASSAY OF LIPASE: CPT | Performed by: FAMILY MEDICINE

## 2020-03-15 RX ORDER — OXYCODONE HYDROCHLORIDE 5 MG/1
5 TABLET ORAL EVERY 6 HOURS PRN
Qty: 10 TABLET | Refills: 0 | Status: SHIPPED | OUTPATIENT
Start: 2020-03-15 | End: 2020-05-08

## 2020-03-15 ASSESSMENT — MIFFLIN-ST. JEOR: SCORE: 1843.29

## 2020-03-15 NOTE — ED AVS SNAPSHOT
[facility] Emergency Department  [address] [place] [address]  Phone:  [phone]  Fax:  [phone]                                    Alex Lynch   MRN: [id number]    Department:  [facility] Emergency Department   Date of Visit:  3/15/2020           After Visit Summary Signature Page    I have received my discharge instructions, and my questions have been answered. I have discussed any challenges I see with this plan with the nurse or doctor.    ..........................................................................................................................................  Patient/Patient Representative Signature      ..........................................................................................................................................  Patient Representative Print Name and Relationship to Patient    ..................................................               ................................................  Date                                   Time    ..........................................................................................................................................  Reviewed by Signature/Title    ...................................................              ..............................................  Date                                               Time          22EPIC Rev 08/18

## 2020-03-15 NOTE — ED PROVIDER NOTES
History     Chief Complaint   Patient presents with     Abdominal Pain     The history is provided by the patient.     Alex Lynch is a 49 year old male who is presents to the emergency department with concerns of abdominal pain. The patient states that he has been seen multiple times prior to today for the same abdominal pain. His pain is located in his right upper quadrant, and is dull and constant. It does not radiate into his back or other locations in his abdomen. His pain started around 1000 this morning after eating chinese food last night and again this morning. He mentions that sometimes he is able to drink a large amount of water and help the pain subside, but when he tried to do this this morning it was unsuccessful. The patient's pain will flare up randomly and he is unsure of what triggers it. He notes eating certain foods will not affect the pain, but then eating that same food one week later will worsen the pain. He denies nausea, vomiting, fever, or chills.The patient has never seen a GI specialist, but says he was told to watch what he eats and to stay hydrated. He mentions his duodenum possibly being inflamed and causing the pain, but has never had a definite diagnosis.     Allergies:  No Known Allergies    Problem List:    Patient Active Problem List    Diagnosis Date Noted     Juvenile osteochondrosis of spine 06/01/2018     Priority: Medium     Hyperlipidemia LDL goal <130 01/05/2018     Priority: Medium     Hypertension goal BP (blood pressure) < 140/90 01/05/2018     Priority: Medium     Obesity, Class I, BMI 30-34.9 01/05/2018     Priority: Medium     History of substance abuse (H) 01/05/2018     Priority: Medium     Methamphetamine, cocaine, marijuana.       Tobacco use disorder 08/03/2016     Priority: Medium        Past Medical History:    Past Medical History:   Diagnosis Date     CVA (cerebral infarction)      Patent foramen ovale      Umbilical hernia        Past Surgical  "History:    Past Surgical History:   Procedure Laterality Date     ABDOMEN SURGERY      green field filter secondary to blood clots.      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 repair 2001     COLONOSCOPY N/A 7/27/2018    Procedure: COMBINED COLONOSCOPY, SINGLE OR MULTIPLE BIOPSY/POLYPECTOMY BY BIOPSY;;  Surgeon: Matt Emerson MD;  Location: [facility]     COLONOSCOPY WITH CO2 INSUFFLATION N/A 7/27/2018    Procedure: COLONOSCOPY WITH CO2 INSUFFLATION;  Colonoscopy and EGD;  Surgeon: Matt Emerson MD;  Location: [facility]     COMBINED ESOPHAGOSCOPY, GASTROSCOPY, DUODENOSCOPY (EGD) WITH CO2 INSUFFLATION N/A 7/27/2018    Procedure: COMBINED ESOPHAGOSCOPY, GASTROSCOPY, DUODENOSCOPY (EGD) WITH CO2 INSUFFLATION;;  Surgeon: Matt Emerson MD;  Location: [facility]     ENT SURGERY      tonsils and adnoids     ESOPHAGOSCOPY, GASTROSCOPY, DUODENOSCOPY (EGD), COMBINED N/A 7/27/2018    Procedure: COMBINED ESOPHAGOSCOPY, GASTROSCOPY, DUODENOSCOPY (EGD), BIOPSY SINGLE OR MULTIPLE;;  Surgeon: Matt Emerson MD;  Location: [facility] OR     HERNIA REPAIR      bilateral inguinal as child     HERNIA REPAIR, UMBILICAL       ORTHOPEDIC SURGERY      shattered hip R, plates and screws     ORTHOPEDIC SURGERY      right ankle screws       Family History:    History reviewed. No pertinent family history.    Social History:  Marital Status:   [4]  Social History     Tobacco Use     Smoking status: Current Every Day Smoker     Packs/day: 1.00     Years: 30.00     Pack years: 30.00     Types: Cigarettes     Smokeless tobacco: Never Used   Substance Use Topics     Alcohol use: Yes     Comment: every weekend, half a dozen drinks of cocktails      Drug use: No        Medications:    atorvastatin (LIPITOR) 20 MG tablet          Review of Systems   All other systems reviewed and are negative.      Physical Exam   BP: (!) 170/103  Pulse: 75  Temp: 98.2  F (36.8  C)  Resp: 19  Height: 175.3 cm (5' 9\")  Weight: 99.8 " kg (220 lb)  SpO2: 99 %      Physical Exam  Vitals signs and nursing note reviewed.   Constitutional:       General: He is not in acute distress.     Appearance: Normal appearance. He is not diaphoretic.   HENT:      Head: Normocephalic and atraumatic.      Right Ear: Tympanic membrane and external ear normal.      Left Ear: Tympanic membrane and external ear normal.      Nose: Nose normal. No congestion or rhinorrhea.      Mouth/Throat:      Mouth: Mucous membranes are moist.      Pharynx: Oropharynx is clear. No oropharyngeal exudate or posterior oropharyngeal erythema.   Eyes:      General: No scleral icterus.     Extraocular Movements: Extraocular movements intact.      Conjunctiva/sclera: Conjunctivae normal.      Pupils: Pupils are equal, round, and reactive to light.   Neck:      Musculoskeletal: Normal range of motion and neck supple. No neck rigidity or muscular tenderness.   Cardiovascular:      Rate and Rhythm: Normal rate and regular rhythm.      Pulses: Normal pulses.      Heart sounds: Normal heart sounds. No murmur.   Pulmonary:      Effort: Pulmonary effort is normal. No respiratory distress.      Breath sounds: Normal breath sounds. No wheezing.   Chest:      Chest wall: No tenderness.   Abdominal:      General: Bowel sounds are normal.      Palpations: Abdomen is soft.      Tenderness: There is abdominal tenderness in the right upper quadrant. There is no guarding or rebound.   Musculoskeletal: Normal range of motion.         General: No tenderness, deformity or signs of injury.   Lymphadenopathy:      Cervical: No cervical adenopathy.   Skin:     General: Skin is warm and dry.      Capillary Refill: Capillary refill takes less than 2 seconds.      Coloration: Skin is not pale.      Findings: No rash.   Neurological:      General: No focal deficit present.      Mental Status: He is alert and oriented to person, place, and time.      Cranial Nerves: No cranial nerve deficit.      Sensory: No sensory  deficit.   Psychiatric:         Thought Content: Thought content normal.         Judgment: Judgment normal.         ED Course        Procedures    Results for orders placed or performed during the hospital encounter of 03/15/20   CBC with platelets differential     Status: None   Result Value Ref Range    WBC 6.4 4.0 - 11.0 10e9/L    RBC Count 4.71 4.4 - 5.9 10e12/L    Hemoglobin 14.8 13.3 - 17.7 g/dL    Hematocrit 44.3 40.0 - 53.0 %    MCV 94 78 - 100 fl    MCH 31.4 26.5 - 33.0 pg    MCHC 33.4 31.5 - 36.5 g/dL    RDW 13.0 10.0 - 15.0 %    Platelet Count 226 150 - 450 10e9/L    Diff Method Automated Method     % Neutrophils 50.8 %    % Lymphocytes 33.5 %    % Monocytes 9.4 %    % Eosinophils 5.3 %    % Basophils 0.8 %    % Immature Granulocytes 0.2 %    Nucleated RBCs 0 0 /100    Absolute Neutrophil 3.2 1.6 - 8.3 10e9/L    Absolute Lymphocytes 2.1 0.8 - 5.3 10e9/L    Absolute Monocytes 0.6 0.0 - 1.3 10e9/L    Absolute Basophils 0.1 0.0 - 0.2 10e9/L    Abs Immature Granulocytes 0.0 0 - 0.4 10e9/L    Absolute Nucleated RBC 0.0    Basic metabolic panel     Status: Abnormal   Result Value Ref Range    Sodium 137 133 - 144 mmol/L    Potassium 4.2 3.4 - 5.3 mmol/L    Chloride 99 94 - 109 mmol/L    Carbon Dioxide 34 (H) 20 - 32 mmol/L    Anion Gap 4 3 - 14 mmol/L    Glucose 149 (H) 70 - 99 mg/dL    Urea Nitrogen 14 7 - 30 mg/dL    Creatinine 1.23 0.66 - 1.25 mg/dL    GFR Estimate 67 >60 mL/min/[1.73_m2]    GFR Estimate If Black 78 >60 mL/min/[1.73_m2]    Calcium 8.7 8.5 - 10.1 mg/dL   Lipase     Status: None   Result Value Ref Range    Lipase 245 73 - 393 U/L   Hepatic panel     Status: None   Result Value Ref Range    Bilirubin Direct <0.1 0.0 - 0.2 mg/dL    Bilirubin Total 0.3 0.2 - 1.3 mg/dL    Albumin 3.7 3.4 - 5.0 g/dL    Protein Total 7.2 6.8 - 8.8 g/dL    Alkaline Phosphatase 74 40 - 150 U/L    ALT 41 0 - 70 U/L    AST 19 0 - 45 U/L     Medications - No data to display    Labs were reviewed and were unremarkable.   This does after reviewing the patient's chart, seem very similar to his previous episodes.  I do not see any abnormal labs or vitals, there is no indication for advanced imaging.  Has not received any narcotics recently after reviewing the [place] drug database so I will send the patient home with a few oxycodone pills.  I did recommend patient should follow-up with GI for further evaluation of this recurrent problem.      Assessments & Plan (with Medical Decision Making)  Right upper quadrant abdominal pain     I have reviewed the nursing notes.    I have reviewed the findings, diagnosis, plan and need for follow up with the patient.              This document serves as a record of services personally performed by Mauro Hendrickson, *. It was created on their behalf by Anabella Yousif, a trained medical scribe. The creation of this record is based on the provider's personal observations and the statements of the patient. This document has been checked and approved by the attending provider.  Note: Chart documentation done in part with Dragon Voice Recognition software. Although reviewed after completion, some word and grammatical errors may remain.  3/15/2020   [facility] EMERGENCY DEPARTMENT     Mauro Hendrickson MD  03/15/20 4546

## 2020-05-08 ENCOUNTER — HOSPITAL ENCOUNTER (EMERGENCY)
Facility: CLINIC | Age: 50
Discharge: HOME | End: 2020-05-08
Attending: EMERGENCY MEDICINE | Admitting: EMERGENCY MEDICINE
Payer: COMMERCIAL

## 2020-05-08 VITALS
TEMPERATURE: 98.9 F | DIASTOLIC BLOOD PRESSURE: 101 MMHG | BODY MASS INDEX: 33.97 KG/M2 | RESPIRATION RATE: 18 BRPM | WEIGHT: 230 LBS | OXYGEN SATURATION: 96 % | SYSTOLIC BLOOD PRESSURE: 168 MMHG

## 2020-05-08 DIAGNOSIS — K29.80 DUODENITIS: ICD-10-CM

## 2020-05-08 LAB
ALBUMIN SERPL-MCNC: 3.7 G/DL (ref 3.4–5)
ALP SERPL-CCNC: 80 U/L (ref 40–150)
ALT SERPL W P-5'-P-CCNC: 51 U/L (ref 0–70)
ANION GAP SERPL CALCULATED.3IONS-SCNC: 2 MMOL/L (ref 3–14)
AST SERPL W P-5'-P-CCNC: 25 U/L (ref 0–45)
BASOPHILS # BLD AUTO: 0.1 10E9/L (ref 0–0.2)
BASOPHILS NFR BLD AUTO: 0.8 %
BILIRUB SERPL-MCNC: 0.3 MG/DL (ref 0.2–1.3)
BUN SERPL-MCNC: 17 MG/DL (ref 7–30)
CALCIUM SERPL-MCNC: 8.2 MG/DL (ref 8.5–10.1)
CHLORIDE SERPL-SCNC: 105 MMOL/L (ref 94–109)
CO2 SERPL-SCNC: 31 MMOL/L (ref 20–32)
CREAT SERPL-MCNC: 1.15 MG/DL (ref 0.66–1.25)
DIFFERENTIAL METHOD BLD: NORMAL
EOSINOPHIL NFR BLD AUTO: 4.2 %
ERYTHROCYTE [DISTWIDTH] IN BLOOD BY AUTOMATED COUNT: 12.9 % (ref 10–15)
GFR SERPL CREATININE-BSD FRML MDRD: 73 ML/MIN/{1.73_M2}
GLUCOSE SERPL-MCNC: 169 MG/DL (ref 70–99)
HCT VFR BLD AUTO: 44.6 % (ref 40–53)
HGB BLD-MCNC: 14.9 G/DL (ref 13.3–17.7)
IMM GRANULOCYTES # BLD: 0 10E9/L (ref 0–0.4)
IMM GRANULOCYTES NFR BLD: 0.4 %
LIPASE SERPL-CCNC: 190 U/L (ref 73–393)
LYMPHOCYTES # BLD AUTO: 2.2 10E9/L (ref 0.8–5.3)
LYMPHOCYTES NFR BLD AUTO: 30.2 %
MCH RBC QN AUTO: 31.4 PG (ref 26.5–33)
MCHC RBC AUTO-ENTMCNC: 33.4 G/DL (ref 31.5–36.5)
MCV RBC AUTO: 94 FL (ref 78–100)
MONOCYTES # BLD AUTO: 0.5 10E9/L (ref 0–1.3)
MONOCYTES NFR BLD AUTO: 7.5 %
NEUTROPHILS # BLD AUTO: 4.1 10E9/L (ref 1.6–8.3)
NEUTROPHILS NFR BLD AUTO: 56.9 %
NRBC # BLD AUTO: 0 10*3/UL
NRBC BLD AUTO-RTO: 0 /100
PLATELET # BLD AUTO: 229 10E9/L (ref 150–450)
POTASSIUM SERPL-SCNC: 3.9 MMOL/L (ref 3.4–5.3)
PROT SERPL-MCNC: 7.6 G/DL (ref 6.8–8.8)
RBC # BLD AUTO: 4.74 10E12/L (ref 4.4–5.9)
SODIUM SERPL-SCNC: 138 MMOL/L (ref 133–144)
WBC # BLD AUTO: 7.2 10E9/L (ref 4–11)

## 2020-05-08 PROCEDURE — 80053 COMPREHEN METABOLIC PANEL: CPT | Performed by: EMERGENCY MEDICINE

## 2020-05-08 PROCEDURE — 25000125 ZZHC RX 250: Performed by: EMERGENCY MEDICINE

## 2020-05-08 PROCEDURE — 99283 EMERGENCY DEPT VISIT LOW MDM: CPT | Performed by: EMERGENCY MEDICINE

## 2020-05-08 PROCEDURE — 99284 EMERGENCY DEPT VISIT MOD MDM: CPT | Mod: Z6 | Performed by: EMERGENCY MEDICINE

## 2020-05-08 PROCEDURE — 25000132 ZZH RX MED GY IP 250 OP 250 PS 637: Performed by: EMERGENCY MEDICINE

## 2020-05-08 PROCEDURE — 83690 ASSAY OF LIPASE: CPT | Performed by: EMERGENCY MEDICINE

## 2020-05-08 PROCEDURE — 85025 COMPLETE CBC W/AUTO DIFF WBC: CPT | Performed by: EMERGENCY MEDICINE

## 2020-05-08 RX ADMIN — LIDOCAINE HYDROCHLORIDE 30 ML: 20 SOLUTION ORAL; TOPICAL at 16:49

## 2020-05-08 RX ADMIN — LIDOCAINE HYDROCHLORIDE 30 ML: 20 SOLUTION ORAL; TOPICAL at 18:17

## 2020-05-08 NOTE — ED AVS SNAPSHOT
[facility] Emergency Department  [address] [place] [address]  Phone:  [phone]  Fax:  [phone]                                    Alex Lynch   MRN: [id number]    Department:  [facility] Emergency Department   Date of Visit:  5/8/2020           After Visit Summary Signature Page    I have received my discharge instructions, and my questions have been answered. I have discussed any challenges I see with this plan with the nurse or doctor.    ..........................................................................................................................................  Patient/Patient Representative Signature      ..........................................................................................................................................  Patient Representative Print Name and Relationship to Patient    ..................................................               ................................................  Date                                   Time    ..........................................................................................................................................  Reviewed by Signature/Title    ...................................................              ..............................................  Date                                               Time          22EPIC Rev 08/18

## 2020-05-08 NOTE — ED PROVIDER NOTES
History     Chief Complaint   Patient presents with     Abdominal Pain     HPI  Alex Lynch is a 49 year old male who presents with recurrent right upper quadrant abdominal pain.  This is been occurring on and off for 30 years he states.  He gets sudden sharp discomfort.  He normally just comes in and they give him some pain pills and he treats it this way.  He has had his gallbladder extensively worked up including a HIDA scan which did not show any compromise.  Notes previously documented duodenitis as a diagnosis.  He states he was supposed to be on some kind of proton pump inhibitor but is not taking anything now.  Last alcohol was approximately 5 days ago he states.  No fever.  No vomiting.    Allergies:  No Known Allergies    Problem List:    Patient Active Problem List    Diagnosis Date Noted     Juvenile osteochondrosis of spine 06/01/2018     Priority: Medium     Hyperlipidemia LDL goal <130 01/05/2018     Priority: Medium     Hypertension goal BP (blood pressure) < 140/90 01/05/2018     Priority: Medium     Obesity, Class I, BMI 30-34.9 01/05/2018     Priority: Medium     History of substance abuse (H) 01/05/2018     Priority: Medium     Methamphetamine, cocaine, marijuana.       Tobacco use disorder 08/03/2016     Priority: Medium        Past Medical History:    Past Medical History:   Diagnosis Date     CVA (cerebral infarction)      Patent foramen ovale      Umbilical hernia        Past Surgical History:    Past Surgical History:   Procedure Laterality Date     ABDOMEN SURGERY      green field filter secondary to blood clots.      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 repair 2001     COLONOSCOPY N/A 7/27/2018    Procedure: COMBINED COLONOSCOPY, SINGLE OR MULTIPLE BIOPSY/POLYPECTOMY BY BIOPSY;;  Surgeon: Matt Emerson MD;  Location: [facility]     COLONOSCOPY WITH CO2 INSUFFLATION N/A 7/27/2018    Procedure: COLONOSCOPY WITH CO2 INSUFFLATION;  Colonoscopy and EGD;  Surgeon: Ki  Matt Desai MD;  Location: [facility]     COMBINED ESOPHAGOSCOPY, GASTROSCOPY, DUODENOSCOPY (EGD) WITH CO2 INSUFFLATION N/A 7/27/2018    Procedure: COMBINED ESOPHAGOSCOPY, GASTROSCOPY, DUODENOSCOPY (EGD) WITH CO2 INSUFFLATION;;  Surgeon: Matt Emerson MD;  Location: [facility]     ENT SURGERY      tonsils and adnoids     ESOPHAGOSCOPY, GASTROSCOPY, DUODENOSCOPY (EGD), COMBINED N/A 7/27/2018    Procedure: COMBINED ESOPHAGOSCOPY, GASTROSCOPY, DUODENOSCOPY (EGD), BIOPSY SINGLE OR MULTIPLE;;  Surgeon: Matt Emerson MD;  Location: [facility]     HERNIA REPAIR      bilateral inguinal as child     HERNIA REPAIR, UMBILICAL       ORTHOPEDIC SURGERY      shattered hip R, plates and screws     ORTHOPEDIC SURGERY      right ankle screws       Family History:    History reviewed. No pertinent family history.    Social History:  Marital Status:   [4]  Social History     Tobacco Use     Smoking status: Former Smoker     Packs/day: 1.00     Years: 30.00     Pack years: 30.00     Types: Cigarettes     Smokeless tobacco: Never Used     Tobacco comment: quit Feb 2020   Substance Use Topics     Alcohol use: Yes     Comment: every weekend, half a dozen drinks of cocktails      Drug use: No        Medications:    omeprazole (PRILOSEC) 20 MG DR capsule  atorvastatin (LIPITOR) 20 MG tablet          Review of Systems  All other systems are reviewed and are negative    Physical Exam   BP: (!) 168/101  Heart Rate: 78  Temp: 98.9  F (37.2  C)  Resp: 18  Weight: 104.3 kg (230 lb)  SpO2: 96 %      Physical Exam  Vitals signs and nursing note reviewed.   Constitutional:       General: He is not in acute distress.     Appearance: He is well-developed. He is not diaphoretic.   HENT:      Head: Normocephalic and atraumatic.   Eyes:      General: No scleral icterus.        Right eye: No discharge.         Left eye: No discharge.      Conjunctiva/sclera: Conjunctivae normal.   Neck:      Musculoskeletal: Normal range of motion  and neck supple.   Cardiovascular:      Rate and Rhythm: Normal rate and regular rhythm.      Heart sounds: Normal heart sounds. No murmur.   Pulmonary:      Effort: Pulmonary effort is normal. No respiratory distress.      Breath sounds: Normal breath sounds. No stridor.   Abdominal:      Palpations: Abdomen is soft.      Tenderness: There is no abdominal tenderness. There is no guarding or rebound.   Musculoskeletal: Normal range of motion.   Skin:     General: Skin is warm and dry.      Coloration: Skin is not pale.      Findings: No erythema or rash.   Neurological:      Mental Status: He is alert.      Cranial Nerves: No cranial nerve deficit.      Motor: No abnormal muscle tone.         ED Course        Procedures               Critical Care time:  none               Results for orders placed or performed during the hospital encounter of 05/08/20 (from the past 24 hour(s))   CBC with platelets differential   Result Value Ref Range    WBC 7.2 4.0 - 11.0 10e9/L    RBC Count 4.74 4.4 - 5.9 10e12/L    Hemoglobin 14.9 13.3 - 17.7 g/dL    Hematocrit 44.6 40.0 - 53.0 %    MCV 94 78 - 100 fl    MCH 31.4 26.5 - 33.0 pg    MCHC 33.4 31.5 - 36.5 g/dL    RDW 12.9 10.0 - 15.0 %    Platelet Count 229 150 - 450 10e9/L    Diff Method Automated Method     % Neutrophils 56.9 %    % Lymphocytes 30.2 %    % Monocytes 7.5 %    % Eosinophils 4.2 %    % Basophils 0.8 %    % Immature Granulocytes 0.4 %    Nucleated RBCs 0 0 /100    Absolute Neutrophil 4.1 1.6 - 8.3 10e9/L    Absolute Lymphocytes 2.2 0.8 - 5.3 10e9/L    Absolute Monocytes 0.5 0.0 - 1.3 10e9/L    Absolute Basophils 0.1 0.0 - 0.2 10e9/L    Abs Immature Granulocytes 0.0 0 - 0.4 10e9/L    Absolute Nucleated RBC 0.0    Comprehensive metabolic panel   Result Value Ref Range    Sodium 138 133 - 144 mmol/L    Potassium 3.9 3.4 - 5.3 mmol/L    Chloride 105 94 - 109 mmol/L    Carbon Dioxide 31 20 - 32 mmol/L    Anion Gap 2 (L) 3 - 14 mmol/L    Glucose 169 (H) 70 - 99 mg/dL     Urea Nitrogen 17 7 - 30 mg/dL    Creatinine 1.15 0.66 - 1.25 mg/dL    GFR Estimate 73 >60 mL/min/[1.73_m2]    GFR Estimate If Black 84 >60 mL/min/[1.73_m2]    Calcium 8.2 (L) 8.5 - 10.1 mg/dL    Bilirubin Total 0.3 0.2 - 1.3 mg/dL    Albumin 3.7 3.4 - 5.0 g/dL    Protein Total 7.6 6.8 - 8.8 g/dL    Alkaline Phosphatase 80 40 - 150 U/L    ALT 51 0 - 70 U/L    AST 25 0 - 45 U/L   Lipase   Result Value Ref Range    Lipase 190 73 - 393 U/L       Medications   lidocaine (XYLOCAINE) 2 % 15 mL, alum & mag hydroxide-simethicone (MAALOX  ES) 15 mL GI Cocktail (30 mLs Oral Given 5/8/20 1649)   lidocaine (XYLOCAINE) 2 % 15 mL, alum & mag hydroxide-simethicone (MAALOX  ES) 15 mL GI Cocktail (30 mLs Oral Given 5/8/20 1817)       Assessments & Plan (with Medical Decision Making)  49-year-old male with recurrent right upper quadrant abdominal pain.  He has been previously diagnosed with duodenitis.  Pain was relieved here with GI cocktail temporarily.  Have recommended antacids.  Also recommended restarting a proton pump inhibitor as this is been recommended in the past but he admits he is not on this currently.  He did asked for pain pills.  I let him know this is probably not the most appropriate way to treat duodenitis especially in light of his history of substance abuse.  Recommended treatment for the actual medical problem.  Have recommended follow-up with your primary care.  Return anytime sooner if condition worsens or other concern     I have reviewed the nursing notes.    I have reviewed the findings, diagnosis, plan and need for follow up with the patient.       Discharge Medication List as of 5/8/2020  6:15 PM      START taking these medications    Details   omeprazole (PRILOSEC) 20 MG DR capsule Take 1 capsule (20 mg) by mouth 2 times daily for 7 days, Disp-14 capsule,R-0, E-Prescribe             Final diagnoses:   Duodenitis       5/8/2020   [facility] EMERGENCY DEPARTMENT     Ashok Esquivel MD  05/08/20  [id number]

## 2020-11-01 ENCOUNTER — HOSPITAL ENCOUNTER (EMERGENCY)
Facility: CLINIC | Age: 50
Discharge: HOME | End: 2020-11-01
Attending: FAMILY MEDICINE | Admitting: FAMILY MEDICINE
Payer: COMMERCIAL

## 2020-11-01 VITALS
SYSTOLIC BLOOD PRESSURE: 168 MMHG | DIASTOLIC BLOOD PRESSURE: 88 MMHG | TEMPERATURE: 97.8 F | OXYGEN SATURATION: 97 % | RESPIRATION RATE: 15 BRPM | HEART RATE: 89 BPM

## 2020-11-01 DIAGNOSIS — R10.11 ABDOMINAL PAIN, RIGHT UPPER QUADRANT: ICD-10-CM

## 2020-11-01 LAB
ALBUMIN SERPL-MCNC: 3.6 G/DL (ref 3.4–5)
ALP SERPL-CCNC: 78 U/L (ref 40–150)
ALT SERPL W P-5'-P-CCNC: 44 U/L (ref 0–70)
ANION GAP SERPL CALCULATED.3IONS-SCNC: 6 MMOL/L (ref 3–14)
AST SERPL W P-5'-P-CCNC: 35 U/L (ref 0–45)
BASOPHILS # BLD AUTO: 0.1 10E9/L (ref 0–0.2)
BASOPHILS NFR BLD AUTO: 0.9 %
BILIRUB SERPL-MCNC: 0.3 MG/DL (ref 0.2–1.3)
BUN SERPL-MCNC: 12 MG/DL (ref 7–30)
CALCIUM SERPL-MCNC: 9.1 MG/DL (ref 8.5–10.1)
CHLORIDE SERPL-SCNC: 101 MMOL/L (ref 94–109)
CO2 SERPL-SCNC: 29 MMOL/L (ref 20–32)
CREAT SERPL-MCNC: 1.03 MG/DL (ref 0.66–1.25)
DIFFERENTIAL METHOD BLD: NORMAL
EOSINOPHIL NFR BLD AUTO: 4.4 %
ERYTHROCYTE [DISTWIDTH] IN BLOOD BY AUTOMATED COUNT: 13 % (ref 10–15)
GFR SERPL CREATININE-BSD FRML MDRD: 83 ML/MIN/{1.73_M2}
GLUCOSE SERPL-MCNC: 123 MG/DL (ref 70–99)
HCT VFR BLD AUTO: 46.7 % (ref 40–53)
HGB BLD-MCNC: 15.9 G/DL (ref 13.3–17.7)
IMM GRANULOCYTES # BLD: 0 10E9/L (ref 0–0.4)
IMM GRANULOCYTES NFR BLD: 0.3 %
LIPASE SERPL-CCNC: 289 U/L (ref 73–393)
LYMPHOCYTES # BLD AUTO: 2 10E9/L (ref 0.8–5.3)
LYMPHOCYTES NFR BLD AUTO: 26.1 %
MCH RBC QN AUTO: 32.3 PG (ref 26.5–33)
MCHC RBC AUTO-ENTMCNC: 34 G/DL (ref 31.5–36.5)
MCV RBC AUTO: 95 FL (ref 78–100)
MONOCYTES # BLD AUTO: 0.6 10E9/L (ref 0–1.3)
MONOCYTES NFR BLD AUTO: 7.1 %
NEUTROPHILS # BLD AUTO: 4.7 10E9/L (ref 1.6–8.3)
NEUTROPHILS NFR BLD AUTO: 61.2 %
NRBC # BLD AUTO: 0 10*3/UL
NRBC BLD AUTO-RTO: 0 /100
PLATELET # BLD AUTO: 220 10E9/L (ref 150–450)
POTASSIUM SERPL-SCNC: 4.5 MMOL/L (ref 3.4–5.3)
PROT SERPL-MCNC: 7.1 G/DL (ref 6.8–8.8)
RBC # BLD AUTO: 4.92 10E12/L (ref 4.4–5.9)
SODIUM SERPL-SCNC: 136 MMOL/L (ref 133–144)
WBC # BLD AUTO: 7.7 10E9/L (ref 4–11)

## 2020-11-01 PROCEDURE — 99284 EMERGENCY DEPT VISIT MOD MDM: CPT | Performed by: FAMILY MEDICINE

## 2020-11-01 PROCEDURE — 250N000011 HC RX IP 250 OP 636: Performed by: FAMILY MEDICINE

## 2020-11-01 PROCEDURE — 96374 THER/PROPH/DIAG INJ IV PUSH: CPT | Performed by: FAMILY MEDICINE

## 2020-11-01 PROCEDURE — 80053 COMPREHEN METABOLIC PANEL: CPT | Performed by: FAMILY MEDICINE

## 2020-11-01 PROCEDURE — 85025 COMPLETE CBC W/AUTO DIFF WBC: CPT | Performed by: FAMILY MEDICINE

## 2020-11-01 PROCEDURE — 83690 ASSAY OF LIPASE: CPT | Performed by: FAMILY MEDICINE

## 2020-11-01 RX ORDER — OXYCODONE HYDROCHLORIDE 5 MG/1
5-10 TABLET ORAL EVERY 4 HOURS PRN
Qty: 4 TABLET | Refills: 0 | Status: SHIPPED | OUTPATIENT
Start: 2020-11-01 | End: 2020-11-01

## 2020-11-01 RX ORDER — KETOROLAC TROMETHAMINE 30 MG/ML
30 INJECTION, SOLUTION INTRAMUSCULAR; INTRAVENOUS ONCE
Status: COMPLETED | OUTPATIENT
Start: 2020-11-01 | End: 2020-11-01

## 2020-11-01 RX ORDER — OXYCODONE HYDROCHLORIDE 5 MG/1
5-10 TABLET ORAL EVERY 4 HOURS PRN
Qty: 8 TABLET | Refills: 0 | Status: SHIPPED | OUTPATIENT
Start: 2020-11-01

## 2020-11-01 RX ORDER — OXYCODONE HYDROCHLORIDE 5 MG/1
5-10 TABLET ORAL EVERY 4 HOURS PRN
Status: DISCONTINUED | OUTPATIENT
Start: 2020-11-01 | End: 2020-11-01 | Stop reason: HOSPADM

## 2020-11-01 RX ADMIN — KETOROLAC TROMETHAMINE 30 MG: 30 INJECTION, SOLUTION INTRAMUSCULAR at 01:43

## 2020-11-01 NOTE — ED AVS SNAPSHOT
[facility]  [address] [place] [address]  Phone: [phone]  Fax: [phone]                                    Alex Lynch   MRN: [id number]    Department: [facility]   Date of Visit: 11/1/2020           After Visit Summary Signature Page    I have received my discharge instructions, and my questions have been answered. I have discussed any challenges I see with this plan with the nurse or doctor.    ..........................................................................................................................................  Patient/Patient Representative Signature      ..........................................................................................................................................  Patient Representative Print Name and Relationship to Patient    ..................................................               ................................................  Date                                   Time    ..........................................................................................................................................  Reviewed by Signature/Title    ...................................................              ..............................................  Date                                               Time          22EPIC Rev 08/18

## 2020-11-01 NOTE — ED PROVIDER NOTES
History     Chief Complaint   Patient presents with     Abdominal Pain     HPI  Alex Lynch is a 50 year old male who was out to eat with his bride at an [place] restaurant tonight about 5:00.  2 hours later he started with recurrent right upper quadrant abdominal pain.  He has been battling this intermittently for the past.  He has had gallbladder ultrasounds, HIDA scan many years ago and saw a surgeon and was told the GB was not the problem.  Had an upper endoscopy and was diagnosed with duodenitis.  Really no rhyme or reason to when this recurs.  He is taking Prilosec daily.  Also is on blood pressure and cholesterol medications.  No nausea or vomiting.  No fevers.  No chest pain or shortness of breath.    Allergies:  No Known Allergies    Problem List:    Patient Active Problem List    Diagnosis Date Noted     Juvenile osteochondrosis of spine 06/01/2018     Priority: Medium     Hyperlipidemia LDL goal <130 01/05/2018     Priority: Medium     Hypertension goal BP (blood pressure) < 140/90 01/05/2018     Priority: Medium     Obesity, Class I, BMI 30-34.9 01/05/2018     Priority: Medium     History of substance abuse (H) 01/05/2018     Priority: Medium     Methamphetamine, cocaine, marijuana.       Tobacco use disorder 08/03/2016     Priority: Medium        Past Medical History:    Past Medical History:   Diagnosis Date     CVA (cerebral infarction)      Patent foramen ovale      Umbilical hernia        Past Surgical History:    Past Surgical History:   Procedure Laterality Date     ABDOMEN SURGERY      green field filter secondary to blood clots.      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 repair 2001     COLONOSCOPY N/A 7/27/2018    Procedure: COMBINED COLONOSCOPY, SINGLE OR MULTIPLE BIOPSY/POLYPECTOMY BY BIOPSY;;  Surgeon: Matt Emerson MD;  Location: [facility]     COLONOSCOPY WITH CO2 INSUFFLATION N/A 7/27/2018    Procedure: COLONOSCOPY WITH CO2 INSUFFLATION;  Colonoscopy and EGD;   Surgeon: Matt Emerson MD;  Location: [facility]     COMBINED ESOPHAGOSCOPY, GASTROSCOPY, DUODENOSCOPY (EGD) WITH CO2 INSUFFLATION N/A 7/27/2018    Procedure: COMBINED ESOPHAGOSCOPY, GASTROSCOPY, DUODENOSCOPY (EGD) WITH CO2 INSUFFLATION;;  Surgeon: Matt Emerson MD;  Location: [facility]     ENT SURGERY      tonsils and adnoids     ESOPHAGOSCOPY, GASTROSCOPY, DUODENOSCOPY (EGD), COMBINED N/A 7/27/2018    Procedure: COMBINED ESOPHAGOSCOPY, GASTROSCOPY, DUODENOSCOPY (EGD), BIOPSY SINGLE OR MULTIPLE;;  Surgeon: Matt Emerson MD;  Location: [facility]     HERNIA REPAIR      bilateral inguinal as child     HERNIA REPAIR, UMBILICAL       ORTHOPEDIC SURGERY      shattered hip R, plates and screws     ORTHOPEDIC SURGERY      right ankle screws       Family History:    No family history on file.    Social History:  Marital Status:   [4]  Social History     Tobacco Use     Smoking status: Former Smoker     Packs/day: 1.00     Years: 30.00     Pack years: 30.00     Types: Cigarettes     Smokeless tobacco: Never Used     Tobacco comment: quit Feb 2020   Substance Use Topics     Alcohol use: Yes     Comment: every weekend, half a dozen drinks of cocktails      Drug use: No        Medications:         oxyCODONE (ROXICODONE) 5 MG tablet       atorvastatin (LIPITOR) 20 MG tablet          Review of Systems   All other systems reviewed and are negative.      Physical Exam   BP: (!) 168/110  Pulse: 78  Temp: 97.8  F (36.6  C)  Resp: 18  SpO2: 97 %      Physical Exam  Constitutional:       General: He is in acute distress ( mild).      Appearance: He is well-developed.   HENT:      Mouth/Throat:      Mouth: Mucous membranes are moist.   Cardiovascular:      Rate and Rhythm: Normal rate and regular rhythm.   Pulmonary:      Effort: Pulmonary effort is normal.      Breath sounds: Normal breath sounds.   Abdominal:      Palpations: Abdomen is soft.      Tenderness: There is abdominal tenderness ( mod RUQ).  Positive signs include Gonzalez's sign.   Musculoskeletal: Normal range of motion.   Skin:     General: Skin is warm and dry.   Neurological:      General: No focal deficit present.      Mental Status: He is alert and oriented to person, place, and time.   Psychiatric:         Mood and Affect: Mood normal.         ED Course  (with Medical Decision Making)    50-year-old with a 20-year history of recurrent right upper quadrant abdominal pain.  Has had gallbladder work-up in the past but does not look like he is had anything done recently.  Is also had upper endoscopy which showed duodenitis and he is on a PPI.  Had recurrent pain tonight 2 hours after eating out at an [place] restaurant.  Tender over the right upper quadrant with positive Gonzalez sign.    We will repeat his labs and give him some Toradol for pain.    Toradol took the edge off the pain but it started to come back again.  He drove himself.  His LFTs and lipase were normal.  White count was normal.    I cannot find that he has had any studies on his gallbladder recently.  The only thing I see is a HIDA scan from 2014.  We discussed repeating his ultrasound and if that is normal, doing a HIDA scan to look for abnormal gallbladder function.  That could certainly be done as an outpatient.  I gave him 4 tablets of oxycodone to use for pain and a limited prescription of 8 tablets that he can fill if needed.  We had a long discussion about controlled substances and he knows that he needs to be very careful and only use them for a very short time.    Does have a history of duodenitis so I really do not want to put him on anti-inflammatories and stir things up in that regard.  He will continue on his PPI.    He is going to contact his primary provider on Monday to see if he can get set up for an outpatient ultrasound and then a HIDA scan if needed.  Verbal and written discharge instructions given.  Patient is comfortable with this plan.          Procedures                Critical Care time:  none               Results for orders placed or performed during the hospital encounter of 11/01/20 (from the past 24 hour(s))   CBC with platelets differential   Result Value Ref Range    WBC 7.7 4.0 - 11.0 10e9/L    RBC Count 4.92 4.4 - 5.9 10e12/L    Hemoglobin 15.9 13.3 - 17.7 g/dL    Hematocrit 46.7 40.0 - 53.0 %    MCV 95 78 - 100 fl    MCH 32.3 26.5 - 33.0 pg    MCHC 34.0 31.5 - 36.5 g/dL    RDW 13.0 10.0 - 15.0 %    Platelet Count 220 150 - 450 10e9/L    Diff Method Automated Method     % Neutrophils 61.2 %    % Lymphocytes 26.1 %    % Monocytes 7.1 %    % Eosinophils 4.4 %    % Basophils 0.9 %    % Immature Granulocytes 0.3 %    Nucleated RBCs 0 0 /100    Absolute Neutrophil 4.7 1.6 - 8.3 10e9/L    Absolute Lymphocytes 2.0 0.8 - 5.3 10e9/L    Absolute Monocytes 0.6 0.0 - 1.3 10e9/L    Absolute Basophils 0.1 0.0 - 0.2 10e9/L    Abs Immature Granulocytes 0.0 0 - 0.4 10e9/L    Absolute Nucleated RBC 0.0    Comprehensive metabolic panel   Result Value Ref Range    Sodium 136 133 - 144 mmol/L    Potassium 4.5 3.4 - 5.3 mmol/L    Chloride 101 94 - 109 mmol/L    Carbon Dioxide 29 20 - 32 mmol/L    Anion Gap 6 3 - 14 mmol/L    Glucose 123 (H) 70 - 99 mg/dL    Urea Nitrogen 12 7 - 30 mg/dL    Creatinine 1.03 0.66 - 1.25 mg/dL    GFR Estimate 83 >60 mL/min/[1.73_m2]    GFR Estimate If Black >90 >60 mL/min/[1.73_m2]    Calcium 9.1 8.5 - 10.1 mg/dL    Bilirubin Total 0.3 0.2 - 1.3 mg/dL    Albumin 3.6 3.4 - 5.0 g/dL    Protein Total 7.1 6.8 - 8.8 g/dL    Alkaline Phosphatase 78 40 - 150 U/L    ALT 44 0 - 70 U/L    AST 35 0 - 45 U/L   Lipase   Result Value Ref Range    Lipase 289 73 - 393 U/L       Medications   oxyCODONE (ROXICODONE) tablet 5-10 mg (has no administration in time range)   ketorolac (TORADOL) injection 30 mg (30 mg Intravenous Given 11/1/20 0143)       Assessments & Plan     I have reviewed the nursing notes.    I have reviewed the findings, diagnosis, plan and need for follow  up with the patient.       Current Discharge Medication List      START taking these medications    Details   oxyCODONE (ROXICODONE) 5 MG tablet Take 1-2 tablets (5-10 mg) by mouth every 4 hours as needed for pain  Qty: 8 tablet, Refills: 0             Final diagnoses:   Abdominal pain, right upper quadrant - post prandial       11/1/2020   [facility] EMERGENCY DEPT     Tristan Kent MD  11/01/20 0329

## 2020-11-01 NOTE — DISCHARGE INSTRUCTIONS
Try to avoid fatty foods.  You may use the oxycodone sparingly for pain.    All narcotics can cause drowsiness and constipation so be careful to avoid those problems.  Take an OTC stool softener if needed.  Narcotics also have addictive potential and can actually make you more sensitive to pain in as little as 3 days so only use them for a very short time.  You should not drive or operate machinery while taking narcotics.  Continue with your acid medicine.  Touch base with Eyad Rueda this week.  I was not able to send him a message as he is not in the [facility] system.    As discussed, I think we should look at your gallbladder again since it has been several years since that was done.  I would start with an ultrasound and if that is abnormal, they can have you see a general surgeon.  If the ultrasound is normal, I would proceed on and get a HIDA scan to evaluate your gallbladder function.  It was a pleasure visiting with you this evening.  I hope this settles down quickly for you.    Thank you for choosing [facility]. We appreciate the opportunity to meet your urgent medical needs. Please let us know if we could have done anything to make your stay more satisfying.    After discharge, please closely monitor for any new or worsening symptoms. Return to the Emergency Department if you develop any acute worsening signs or symptoms.    If you had lab work, cultures or imaging studies done during your stay, the final results may still be pending. We will call you if your plan of care needs to change. However, if you are not improving as expected, please follow up with your primary care provider or clinic.     Start any prescription medications that were prescribed to you and take them as directed.     Please see additional handouts that may be pertinent to your condition.

## 2023-11-02 ENCOUNTER — APPOINTMENT (OUTPATIENT)
Dept: ULTRASOUND IMAGING | Facility: CLINIC | Age: 53
End: 2023-11-02
Attending: PHYSICIAN ASSISTANT
Payer: COMMERCIAL

## 2023-11-02 ENCOUNTER — HOSPITAL ENCOUNTER (EMERGENCY)
Facility: CLINIC | Age: 53
Discharge: HOME | End: 2023-11-02
Attending: PHYSICIAN ASSISTANT | Admitting: PHYSICIAN ASSISTANT
Payer: COMMERCIAL

## 2023-11-02 VITALS
DIASTOLIC BLOOD PRESSURE: 97 MMHG | BODY MASS INDEX: 32.93 KG/M2 | OXYGEN SATURATION: 99 % | HEART RATE: 74 BPM | SYSTOLIC BLOOD PRESSURE: 153 MMHG | TEMPERATURE: 98 F | WEIGHT: 223 LBS | RESPIRATION RATE: 16 BRPM

## 2023-11-02 DIAGNOSIS — S39.013A STRAIN OF MUSCLE OF PELVIS: ICD-10-CM

## 2023-11-02 LAB
ALBUMIN UR-MCNC: NEGATIVE MG/DL
APPEARANCE UR: CLEAR
BILIRUB UR QL STRIP: NEGATIVE
COLOR UR AUTO: YELLOW
GLUCOSE UR STRIP-MCNC: NEGATIVE MG/DL
HGB UR QL STRIP: NEGATIVE
KETONES UR STRIP-MCNC: NEGATIVE MG/DL
LEUKOCYTE ESTERASE UR QL STRIP: NEGATIVE
MUCOUS THREADS #/AREA URNS LPF: PRESENT /LPF
NITRATE UR QL: NEGATIVE
PH UR STRIP: 6 [PH] (ref 5–7)
RBC URINE: <1 /HPF
SP GR UR STRIP: 1.01 (ref 1–1.03)
SQUAMOUS EPITHELIAL: 1 /HPF
UROBILINOGEN UR STRIP-MCNC: NORMAL MG/DL
WBC URINE: <1 /HPF

## 2023-11-02 PROCEDURE — 81001 URINALYSIS AUTO W/SCOPE: CPT | Performed by: PHYSICIAN ASSISTANT

## 2023-11-02 PROCEDURE — 99284 EMERGENCY DEPT VISIT MOD MDM: CPT | Performed by: PHYSICIAN ASSISTANT

## 2023-11-02 PROCEDURE — 93976 VASCULAR STUDY: CPT

## 2023-11-02 PROCEDURE — 99284 EMERGENCY DEPT VISIT MOD MDM: CPT | Mod: 25 | Performed by: PHYSICIAN ASSISTANT

## 2023-11-02 ASSESSMENT — ACTIVITIES OF DAILY LIVING (ADL)
ADLS_ACUITY_SCORE: 35
ADLS_ACUITY_SCORE: 35

## 2023-11-02 NOTE — DISCHARGE INSTRUCTIONS
It was a pleasure working with you today!  I hope your condition improves rapidly!     Thankfully, all of your testing came out okay today.  Your exam was reassuring as well.  I did not notice any hernia.  Your ultrasound looked great.  Essentially, I think you strained a muscle in your pelvic region.  This should improve day by day over the next 1-2 weeks.  Try using heat over the painful area for 20 minutes every few hours.  You can also use ibuprofen 600 mg every 6 hours as needed for pain.  Follow-up with your primary in a week if symptoms are not improving.

## 2023-11-02 NOTE — ED TRIAGE NOTES
Presents with groin pain that started two days ago during intercourse. States he was interrupted prior to climax and developed pain. It is a dull pain that is worse when he is sitting. No issues with voiding.     Triage Assessment (Adult)       Row Name 11/02/23 1548          Triage Assessment    Airway WDL WDL        Respiratory WDL    Respiratory WDL WDL        Skin Circulation/Temperature WDL    Skin Circulation/Temperature WDL WDL        Cardiac WDL    Cardiac WDL WDL        Peripheral/Neurovascular WDL    Peripheral Neurovascular WDL WDL        Cognitive/Neuro/Behavioral WDL    Cognitive/Neuro/Behavioral WDL WDL

## 2023-11-02 NOTE — ED PROVIDER NOTES
"  History     Chief Complaint   Patient presents with    Groin Pain     HPI  Alex Lynch is a 53 year old male who presents for evaluation of pelvic discomfort for the past 2 days.  Symptoms started abruptly after he was receiving oral sex from his significant other.  He reports that it was a rough encounter.  It was interrupted abruptly by the door opening.  This caused some potential injury to the region given the abrupt stopping of movement.  Since then, he has a \"pulling pain which is constant and dull and sometimes achy\".  He states it is better when he is sitting upright or standing.  Worse with sitting.  He does not have any dysuria, frequency, urgency, flank pain, or gross hematuria.  No penile discharge.  He denies any chance of STD.  He is monogamous with one woman.  Normal BMs.  No pain with passing BM.  No constipation, hematochezia, or melena.  No diarrhea.  He actually tried masturbation today and states that this did not cause any increased pain.  He denies any bulging out of the area.  Has never had this issue before.  Has not taken any medication for symptoms.    Allergies:  No Known Allergies    Problem List:    Patient Active Problem List    Diagnosis Date Noted    Juvenile osteochondrosis of spine 06/01/2018     Priority: Medium    Hyperlipidemia LDL goal <130 01/05/2018     Priority: Medium    Hypertension goal BP (blood pressure) < 140/90 01/05/2018     Priority: Medium    Obesity, Class I, BMI 30-34.9 01/05/2018     Priority: Medium    History of substance abuse (H) 01/05/2018     Priority: Medium     Methamphetamine, cocaine, marijuana.      Tobacco use disorder 08/03/2016     Priority: Medium        Past Medical History:    Past Medical History:   Diagnosis Date    CVA (cerebral infarction)     Patent foramen ovale     Umbilical hernia        Past Surgical History:    Past Surgical History:   Procedure Laterality Date    ABDOMEN SURGERY      green field filter secondary to blood " clots.     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 repair 2001    COLONOSCOPY N/A 7/27/2018    Procedure: COMBINED COLONOSCOPY, SINGLE OR MULTIPLE BIOPSY/POLYPECTOMY BY BIOPSY;;  Surgeon: Matt Emerson MD;  Location: [facility] OR    COLONOSCOPY WITH CO2 INSUFFLATION N/A 7/27/2018    Procedure: COLONOSCOPY WITH CO2 INSUFFLATION;  Colonoscopy and EGD;  Surgeon: Matt Emerson MD;  Location: [facility] OR    COMBINED ESOPHAGOSCOPY, GASTROSCOPY, DUODENOSCOPY (EGD) WITH CO2 INSUFFLATION N/A 7/27/2018    Procedure: COMBINED ESOPHAGOSCOPY, GASTROSCOPY, DUODENOSCOPY (EGD) WITH CO2 INSUFFLATION;;  Surgeon: Matt Emerson MD;  Location: [facility] OR    ENT SURGERY      tonsils and adnoids    ESOPHAGOSCOPY, GASTROSCOPY, DUODENOSCOPY (EGD), COMBINED N/A 7/27/2018    Procedure: COMBINED ESOPHAGOSCOPY, GASTROSCOPY, DUODENOSCOPY (EGD), BIOPSY SINGLE OR MULTIPLE;;  Surgeon: Matt Emerson MD;  Location: [facility] OR    HERNIA REPAIR      bilateral inguinal as child    HERNIA REPAIR, UMBILICAL      ORTHOPEDIC SURGERY      shattered hip R, plates and screws    ORTHOPEDIC SURGERY      right ankle screws       Family History:    No family history on file.    Social History:  Marital Status:   [4]  Social History     Tobacco Use    Smoking status: Former     Packs/day: 1.00     Years: 30.00     Additional pack years: 0.00     Total pack years: 30.00     Types: Cigarettes    Smokeless tobacco: Never    Tobacco comments:     quit Feb 2020   Substance Use Topics    Alcohol use: Yes     Comment: every weekend, half a dozen drinks of cocktails     Drug use: No        Medications:    atorvastatin (LIPITOR) 20 MG tablet  oxyCODONE (ROXICODONE) 5 MG tablet          Review of Systems   All other systems reviewed and are negative.      Physical Exam   BP: (!) 153/97  Pulse: 74  Temp: 98  F (36.7  C)  Resp: 16  Weight: 101.2 kg (223 lb)  SpO2: 99 %      Physical Exam  Vitals and nursing note reviewed.   Constitutional:        General: He is not in acute distress.     Appearance: He is not diaphoretic.   HENT:      Head: Normocephalic and atraumatic.      Right Ear: External ear normal.      Left Ear: External ear normal.      Nose: Nose normal.      Mouth/Throat:      Pharynx: No oropharyngeal exudate.   Eyes:      General: No scleral icterus.        Right eye: No discharge.         Left eye: No discharge.      Conjunctiva/sclera: Conjunctivae normal.      Pupils: Pupils are equal, round, and reactive to light.   Neck:      Thyroid: No thyromegaly.   Cardiovascular:      Rate and Rhythm: Normal rate and regular rhythm.      Heart sounds: Normal heart sounds. No murmur heard.  Pulmonary:      Effort: Pulmonary effort is normal. No respiratory distress.      Breath sounds: Normal breath sounds. No wheezing or rales.   Chest:      Chest wall: No tenderness.   Abdominal:      General: Bowel sounds are normal. There is no distension.      Palpations: Abdomen is soft. There is no mass.      Tenderness: There is no abdominal tenderness. There is no guarding or rebound.   Genitourinary:     Penis: Normal.       Testes: Normal.      Comments: Scrotum appears normal.  There is minor tenderness of the right testicle but no significant swelling.  No nodule.  Symmetric size.  No epididymal tenderness.  Left side is normal.  No scrotal masses or abnormalities.  No hernia.  Penis is normal.  No masses.  Glans penis normal.  Urethral opening normal.  No urethral discharge.  Musculoskeletal:         General: No tenderness or deformity. Normal range of motion.      Cervical back: Normal range of motion and neck supple.   Lymphadenopathy:      Cervical: No cervical adenopathy.   Skin:     General: Skin is warm and dry.      Capillary Refill: Capillary refill takes less than 2 seconds.      Findings: No erythema or rash.   Neurological:      Mental Status: He is alert and oriented to person, place, and time.      Cranial Nerves: No cranial nerve  deficit.   Psychiatric:         Behavior: Behavior normal.         Thought Content: Thought content normal.         ED Course                 Procedures              Critical Care time:  none               Results for orders placed or performed during the hospital encounter of 11/02/23 (from the past 24 hour(s))   US Testicular & Scrotum w Doppler Ltd    Narrative    EXAM: US TESTICULAR AND SCROTUM WITH DOPPLER LIMITED  LOCATION: [facility]  DATE: 11/2/2023    INDICATION: groin testicular pain; scrotal pain  COMPARISON: None.  TECHNIQUE: Ultrasound of scrotum with color flow and spectral Doppler with waveform analysis performed.    FINDINGS:    RIGHT: Right testicle measures 5.0 x 2.0 x 2.7 cm. Normal testicle with no masses. Normal arterial duplex and normal color flow. Normal epididymis. No hydrocele. No varicocele.    LEFT: Left testicle measures 4.9 x 1.9 x 2.7 cm. Normal testicle with no masses. Normal arterial duplex and normal color flow. Normal epididymis. No hydrocele. No varicocele.      Impression    IMPRESSION:  1.  No sonographic evidence of epididymoorchitis or torsion.   UA with Microscopic reflex to Culture    Specimen: Urine, Midstream   Result Value Ref Range    Color Urine Yellow Colorless, Straw, Light Yellow, Yellow    Appearance Urine Clear Clear    Glucose Urine Negative Negative mg/dL    Bilirubin Urine Negative Negative    Ketones Urine Negative Negative mg/dL    Specific Gravity Urine 1.014 1.003 - 1.035    Blood Urine Negative Negative    pH Urine 6.0 5.0 - 7.0    Protein Albumin Urine Negative Negative mg/dL    Urobilinogen Urine Normal Normal, 2.0 mg/dL    Nitrite Urine Negative Negative    Leukocyte Esterase Urine Negative Negative    Mucus Urine Present (A) None Seen /LPF    RBC Urine <1 <=2 /HPF    WBC Urine <1 <=5 /HPF    Squamous Epithelials Urine 1 <=1 /HPF    Narrative    Urine Culture not indicated       Medications - No data to  display    Assessments & Plan (with Medical Decision Making)  Strain of muscle of pelvis     53 year old male presents for evaluation of groin discomfort behind the testicular area in the pelvis which occurred abruptly after being interrupted during an oral sex encounter.  Has had a dull aching discomfort ever since.  No GI or  symptoms otherwise.  Monogamous.  No chance of STD.  No fevers or chills.  See HPI above for details.  Exam with blood pressure 153/97, temperature 98.0, pulse 74, respirations 16, oxygen saturation 99% on room air.  Patient is in no acute distress.  No abdominal tenderness.  No hepatosplenomegaly.  No masses.  No hernia.   exam completely normal.  Very minor tenderness to the right testicle but I do not palpate any masses.  No epididymal enlargement.  Testicles symmetric.  No scrotal masses.  Repeated Valsalva maneuver without hernia.  Penis is normal.  No urethral discharge.  No rashes.  Minor tenderness posterior to the scrotum in the pelvic area inferiorly.  Urinalysis negative.  Scrotal ultrasound negative.  Good blood flow to both testicles.  No masses.  No abnormalities.  Reviewed the findings with the patient.  Reassured him that we did not find any emergent abnormalities.  His symptoms and exam consistent with more of a pelvic muscle strain.  Symptoms improved with position change.  Does not have any GI or  symptoms.  No STD symptoms.  No fevers or chills.  Rest recommended.  Heat application for 20 minutes every couple hours.  Ibuprofen 600 mg every 6 hours as needed for pain.  Should improve in the next 7-14 days.  If no improvement or worsening, follow-up with primary care.  Patient was in agreement with this plan and was suitable for discharge.     I have reviewed the nursing notes.    I have reviewed the findings, diagnosis, plan and need for follow up with the patient.           Medical Decision Making  The patient's presentation was of moderate complexity (an acute  complicated injury).    The patient's evaluation involved:  ordering and/or review of 2 test(s) in this encounter (see separate area of note for details)    The patient's management necessitated only low risk treatment.        New Prescriptions    No medications on file       Final diagnoses:   Strain of muscle of pelvis     Disclaimer: This note consists of symbols derived from keyboarding, dictation and/or voice recognition software. As a result, there may be errors in the script that have gone undetected. Please consider this when interpreting information found in this chart.      11/2/2023   [facility] EMERGENCY DEPT       Fabiano Lagunas PA-C  11/02/23 0310

## 2023-12-14 ENCOUNTER — TRANSCRIBE ORDERS (OUTPATIENT)
Dept: OTHER | Age: 53
End: 2023-12-14

## 2023-12-14 DIAGNOSIS — R10.2 PELVIC AND PERINEAL PAIN: Primary | ICD-10-CM

## 2025-04-16 ENCOUNTER — TRANSCRIBE ORDERS (OUTPATIENT)
Dept: OTHER | Age: 55
End: 2025-04-16

## 2025-04-16 ENCOUNTER — THERAPY VISIT (OUTPATIENT)
Dept: PHYSICAL THERAPY | Facility: CLINIC | Age: 55
End: 2025-04-16
Attending: STUDENT IN AN ORGANIZED HEALTH CARE EDUCATION/TRAINING PROGRAM
Payer: COMMERCIAL

## 2025-04-16 DIAGNOSIS — M54.2 NECK PAIN: Primary | ICD-10-CM

## 2025-04-16 DIAGNOSIS — G89.29 CHRONIC LEFT SHOULDER PAIN: ICD-10-CM

## 2025-04-16 DIAGNOSIS — M25.512 CHRONIC LEFT SHOULDER PAIN: ICD-10-CM

## 2025-04-16 DIAGNOSIS — M25.512 ACUTE PAIN OF LEFT SHOULDER: ICD-10-CM

## 2025-04-16 DIAGNOSIS — M79.602 LEFT ARM PAIN: Primary | ICD-10-CM

## 2025-04-16 PROCEDURE — 97110 THERAPEUTIC EXERCISES: CPT | Mod: GP | Performed by: PHYSICAL THERAPIST

## 2025-04-16 PROCEDURE — 97161 PT EVAL LOW COMPLEX 20 MIN: CPT | Mod: GP | Performed by: PHYSICAL THERAPIST

## 2025-04-16 PROCEDURE — 97140 MANUAL THERAPY 1/> REGIONS: CPT | Mod: GP | Performed by: PHYSICAL THERAPIST

## 2025-04-16 ASSESSMENT — ACTIVITIES OF DAILY LIVING (ADL)
WHEN_LYING_ON_THE_INVOLVED_SIDE: 2
PLEASE_INDICATE_YOR_PRIMARY_REASON_FOR_REFERRAL_TO_THERAPY:: SHOULDER
REACHING_FOR_SOMETHING_ON_A_HIGH_SHELF: 2
AT_ITS_WORST?: 2

## 2025-04-16 NOTE — PROGRESS NOTES
PHYSICAL THERAPY EVALUATION  Type of Visit: Evaluation       Fall Risk Screen:  Have you fallen 2 or more times in the past year?: No  Have you fallen and had an injury in the past year?: No    Subjective         Presenting condition or subjective complaint: shooting nerve type pain from shoulder to fingertip on left.  Not too bad lately, but constant numbness in L middle finger.  Gets HA on L side of head on occasion.  Date of onset: 03/27/25 (MD referral)    Relevant medical history: DVT (blood clot); Hearing problems; Heart problems; High blood pressure; Implanted device; Overweight; Pain at night or rest   Dates & types of surgery: tendon transplanr left wrist 2000    Prior diagnostic imaging/testing results: X-ray   cervical - Multilevel degenerative change with mild to moderate multilevel disc base narrowing from C3-4 through C6-7. Slight degenerative anterolisthesis of C2 on C3. There is evidence of some facet arthropathy in the mid to lower cervical spine.   L Shoulder - Slight degenerative change of the left glenohumeral joint.   Mild degenerative change of the acromioclavicular joint without widening. No periarticular calcification.   Prior therapy history for the same diagnosis, illness or injury: No      Prior Level of Function  Transfers: Independent  Ambulation: Independent  ADL: Independent  IADL:     Living Environment  Social support: With a significant other or spouse   Type of home: House   Stairs to enter the home: Yes 2 Is there a railing: No     Ramp: No   Stairs inside the home: Yes 14 Is there a railing: Yes     Help at home: None  Equipment owned:       Employment: Yes contractor  Hobbies/Interests: atv tv walking    Patient goals for therapy: na    Pain assessment:      Objective   CERVICAL SPINE EVALUATION  PAIN: Pain Level at Rest: 1/10  Pain Level with Use: 4/10  Pain Location: cervical spine and shoulder  Pain Quality: Aching, Dull, and Numb  Pain Frequency: intermittent  Pain is Worst:  daytime or nighttime  Pain is Exacerbated By: turning head;  lay on arm  Pain is Relieved By: prednisone;  leg arm hang with weight  Pain Progression: Unchanged  INTEGUMENTARY (edema, incisions): WNL  POSTURE: Standing Posture: Rounded shoulders, Forward head  Sitting Posture: Rounded shoulders, Forward head  GAIT:   Weightbearing Status:   Assistive Device(s):   Gait Deviations:   BALANCE/PROPRIOCEPTION:   WEIGHTBEARING ALIGNMENT:   ROM:   (Degrees) Left AROM Right AROM    Cervical Flexion Normal     Cervical Extension Full with pain     Cervical Side bend      Cervical Rotation Decreased 25% full    Cervical Protrusion     Cervical Retraction Normal - no change in finger sx    Thoracic Flexion     Thoracic Extension     Thoracic Rotation       Left AROM Left PROM Right AROM Right PROM   Shoulder Flexion full      Shoulder Extension       Shoulder Abduction       Shoulder Adduction       Shoulder IR       Shoulder ER       Shoulder Horiz Abduction       Shoulder Horiz Adduction       Pain:   End Feel:     MYOTOMES: WNL  DTR S:   CORD SIGNS:   DERMATOMES:  numbness at tip of middle finger on L   NEURAL TENSION:   FLEXIBILITY:    SPECIAL TESTS: WNL  Shoulder special tests negative today  PALPATION:   + Tenderness At Location Left Right   Sternocleidomastoid     Scalenes     Rhomboids +    Facet     Upper Trap +    Levator +    Erector Spinae +    Suboccipitals +      SPINAL SEGMENTAL CONCLUSIONS:  hypo in C-spine      Assessment & Plan   CLINICAL IMPRESSIONS  Medical Diagnosis: L arm pain/ acute pain L shoulder    Treatment Diagnosis: L arm pain/ cervical radic   Impression/Assessment: Patient is a 54 year old male with L neck/ shoulder complaints.  The following significant findings have been identified: Pain, Decreased ROM/flexibility, Decreased joint mobility, Impaired muscle performance, and Decreased activity tolerance. These impairments interfere with their ability to perform self care tasks, work tasks,  recreational activities, household chores, driving , household mobility, and community mobility as compared to previous level of function.     Clinical Decision Making (Complexity):  Clinical Presentation: Stable/Uncomplicated  Clinical Presentation Rationale: based on medical and personal factors listed in PT evaluation  Clinical Decision Making (Complexity): Low complexity    PLAN OF CARE  Treatment Interventions:  Interventions: Manual Therapy, Neuromuscular Re-education, Therapeutic Activity, Therapeutic Exercise    Long Term Goals     PT Goal 1  Goal Identifier: neck/ shoulder  Goal Description: full CROM without pain  Rationale: to maximize safety and independence with performance of ADLs and functional tasks;to maximize safety and independence within the home;to maximize safety and independence within the community;to maximize safety and independence with transportation;to maximize safety and independence with self cares  Target Date: 06/11/25      Frequency of Treatment: 1x/ week  Duration of Treatment: 8 weeks    Recommended Referrals to Other Professionals:   Education Assessment:   Learner/Method: Patient;Demonstration;Pictures/Video    Risks and benefits of evaluation/treatment have been explained.   Patient/Family/caregiver agrees with Plan of Care.     Evaluation Time:     PT Eval, Low Complexity Minutes (42000): 15       Signing Clinician: Leif Vázquez, PT        [facility]                                                                                   OUTPATIENT PHYSICAL THERAPY      PLAN OF TREATMENT FOR OUTPATIENT REHABILITATION   Patient's Last Name, First Name, Alex Amado YOB: 1970   Provider's Name   [facility]   Medical Record No.  [id number]     Onset Date: 03/27/25 (MD referral)  Start of Care Date: 04/16/25     Medical Diagnosis:  L arm pain/ acute pain L shoulder      PT Treatment Diagnosis:   L arm pain/ cervical radic Plan of Treatment  Frequency/Duration: 1x/ week/ 8 weeks    Certification date from 04/16/25 to 06/11/25         See note for plan of treatment details and functional goals     Leif Vázquez, PT                         I CERTIFY THE NEED FOR THESE SERVICES FURNISHED UNDER        THIS PLAN OF TREATMENT AND WHILE UNDER MY CARE .             Physician Signature               Date    X_____________________________________________________                  Referring Provider:  Mayra Mendiola PA-C    Initial Assessment  See Epic Evaluation- Start of Care Date: 04/16/25

## (undated) DEVICE — SOL WATER IRRIG 1000ML BOTTLE 07139-09

## (undated) RX ORDER — FENTANYL CITRATE 50 UG/ML
INJECTION, SOLUTION INTRAMUSCULAR; INTRAVENOUS
Status: DISPENSED
Start: 2018-07-27